# Patient Record
Sex: FEMALE | Race: WHITE | NOT HISPANIC OR LATINO | Employment: UNEMPLOYED | URBAN - METROPOLITAN AREA
[De-identification: names, ages, dates, MRNs, and addresses within clinical notes are randomized per-mention and may not be internally consistent; named-entity substitution may affect disease eponyms.]

---

## 2017-03-19 ENCOUNTER — APPOINTMENT (EMERGENCY)
Dept: RADIOLOGY | Facility: HOSPITAL | Age: 27
End: 2017-03-19
Payer: SUBSIDIZED

## 2017-03-19 ENCOUNTER — HOSPITAL ENCOUNTER (EMERGENCY)
Facility: HOSPITAL | Age: 27
Discharge: HOME | End: 2017-03-19
Attending: EMERGENCY MEDICINE | Admitting: EMERGENCY MEDICINE
Payer: SUBSIDIZED

## 2017-03-19 VITALS
HEIGHT: 68 IN | BODY MASS INDEX: 44.41 KG/M2 | OXYGEN SATURATION: 96 % | WEIGHT: 293 LBS | SYSTOLIC BLOOD PRESSURE: 172 MMHG | HEART RATE: 93 BPM | DIASTOLIC BLOOD PRESSURE: 90 MMHG | TEMPERATURE: 99.3 F | RESPIRATION RATE: 20 BRPM

## 2017-03-19 DIAGNOSIS — M79.606 LEG PAIN: Primary | ICD-10-CM

## 2017-03-19 PROCEDURE — 73564 X-RAY EXAM KNEE 4 OR MORE: CPT

## 2017-03-19 PROCEDURE — 73620 X-RAY EXAM OF FOOT: CPT

## 2017-03-19 PROCEDURE — 73590 X-RAY EXAM OF LOWER LEG: CPT

## 2017-03-19 PROCEDURE — 73610 X-RAY EXAM OF ANKLE: CPT

## 2017-03-19 PROCEDURE — 99284 EMERGENCY DEPT VISIT MOD MDM: CPT

## 2017-03-19 RX ORDER — HYDROCODONE BITARTRATE AND ACETAMINOPHEN 5; 325 MG/1; MG/1
1 TABLET ORAL EVERY 8 HOURS PRN
Qty: 10 TABLET | Refills: 0 | Status: SHIPPED | OUTPATIENT
Start: 2017-03-19 | End: 2017-03-22

## 2017-03-19 RX ORDER — NAPROXEN 500 MG/1
500 TABLET ORAL ONCE
Status: COMPLETED | OUTPATIENT
Start: 2017-03-19 | End: 2017-03-19

## 2017-03-19 RX ORDER — HYDROCODONE BITARTRATE AND ACETAMINOPHEN 5; 325 MG/1; MG/1
1 TABLET ORAL ONCE
Status: COMPLETED | OUTPATIENT
Start: 2017-03-19 | End: 2017-03-19

## 2017-03-19 RX ORDER — NAPROXEN 500 MG/1
500 TABLET ORAL 2 TIMES DAILY WITH MEALS
Qty: 10 TABLET | Refills: 0 | Status: SHIPPED | OUTPATIENT
Start: 2017-03-19 | End: 2018-01-03

## 2017-03-19 RX ADMIN — NAPROXEN 500 MG: 500 TABLET ORAL at 19:24

## 2017-03-19 RX ADMIN — HYDROCODONE BITARTRATE AND ACETAMINOPHEN 1 TABLET: 5; 325 TABLET ORAL at 19:25

## 2017-05-28 ENCOUNTER — HOSPITAL ENCOUNTER (EMERGENCY)
Facility: HOSPITAL | Age: 27
Discharge: HOME | End: 2017-05-28
Attending: EMERGENCY MEDICINE | Admitting: EMERGENCY MEDICINE
Payer: SUBSIDIZED

## 2017-05-28 VITALS
OXYGEN SATURATION: 99 % | SYSTOLIC BLOOD PRESSURE: 176 MMHG | WEIGHT: 293 LBS | DIASTOLIC BLOOD PRESSURE: 96 MMHG | RESPIRATION RATE: 20 BRPM | BODY MASS INDEX: 76.78 KG/M2 | TEMPERATURE: 99 F | HEART RATE: 101 BPM

## 2017-05-28 DIAGNOSIS — N39.0 UTI (URINARY TRACT INFECTION): Primary | ICD-10-CM

## 2017-05-28 LAB
BACTERIA UR QL AUTO: ABNORMAL /HPF
BILIRUB UR QL STRIP: NEGATIVE
CLARITY UR: ABNORMAL
COLOR UR: YELLOW
GLUCOSE UR STRIP-MCNC: NEGATIVE MG/DL
HCG UR QL: NEGATIVE
HGB UR QL STRIP.AUTO: NEGATIVE
KETONES UR STRIP-MCNC: NEGATIVE MG/DL
LEUKOCYTE ESTERASE UR QL STRIP: ABNORMAL
NITRITE UR QL STRIP: NEGATIVE
NON-SQ EPI CELLS URNS QL MICRO: ABNORMAL /HPF
PH UR STRIP.AUTO: 6.5 [PH] (ref 5–9)
PROT UR STRIP-MCNC: NEGATIVE MG/DL
RBC #/AREA URNS AUTO: ABNORMAL /HPF
SP GR UR STRIP.AUTO: 1.02 (ref 1–1.03)
UROBILINOGEN UR QL STRIP.AUTO: 0.2 E.U./DL
WBC #/AREA URNS AUTO: ABNORMAL /HPF

## 2017-05-28 PROCEDURE — 81001 URINALYSIS AUTO W/SCOPE: CPT | Performed by: EMERGENCY MEDICINE

## 2017-05-28 PROCEDURE — 81025 URINE PREGNANCY TEST: CPT | Performed by: EMERGENCY MEDICINE

## 2017-05-28 PROCEDURE — 87086 URINE CULTURE/COLONY COUNT: CPT | Performed by: EMERGENCY MEDICINE

## 2017-05-28 PROCEDURE — 99284 EMERGENCY DEPT VISIT MOD MDM: CPT

## 2017-05-28 PROCEDURE — 87147 CULTURE TYPE IMMUNOLOGIC: CPT | Performed by: EMERGENCY MEDICINE

## 2017-05-28 RX ORDER — CEFADROXIL 500 MG/1
500 CAPSULE ORAL 2 TIMES DAILY
Qty: 14 CAPSULE | Refills: 0 | Status: SHIPPED | OUTPATIENT
Start: 2017-05-28 | End: 2017-06-04

## 2017-05-28 RX ORDER — ALBUTEROL SULFATE 90 UG/1
2 AEROSOL, METERED RESPIRATORY (INHALATION) EVERY 6 HOURS PRN
COMMUNITY
End: 2018-06-18

## 2017-05-28 RX ORDER — NAPROXEN 500 MG/1
500 TABLET ORAL ONCE
Status: COMPLETED | OUTPATIENT
Start: 2017-05-28 | End: 2017-05-28

## 2017-05-28 RX ORDER — CEPHALEXIN 500 MG/1
500 CAPSULE ORAL ONCE
Status: COMPLETED | OUTPATIENT
Start: 2017-05-28 | End: 2017-05-28

## 2017-05-28 RX ORDER — NAPROXEN 500 MG/1
500 TABLET ORAL 2 TIMES DAILY WITH MEALS
Qty: 10 TABLET | Refills: 0 | Status: SHIPPED | OUTPATIENT
Start: 2017-05-28 | End: 2018-01-05

## 2017-05-28 RX ADMIN — NAPROXEN 500 MG: 500 TABLET ORAL at 21:03

## 2017-05-28 RX ADMIN — CEPHALEXIN 500 MG: 500 CAPSULE ORAL at 21:03

## 2017-05-30 LAB
BACTERIA UR CULT: NORMAL
BACTERIA UR CULT: NORMAL

## 2017-12-05 ENCOUNTER — GENERIC CONVERSION - ENCOUNTER (OUTPATIENT)
Dept: OTHER | Facility: OTHER | Age: 27
End: 2017-12-05

## 2017-12-05 DIAGNOSIS — N93.8 OTHER SPECIFIED ABNORMAL UTERINE AND VAGINAL BLEEDING: ICD-10-CM

## 2017-12-06 ENCOUNTER — HOSPITAL ENCOUNTER (OUTPATIENT)
Dept: ULTRASOUND IMAGING | Facility: HOSPITAL | Age: 27
Discharge: HOME | End: 2017-12-06
Payer: COMMERCIAL

## 2017-12-06 DIAGNOSIS — N93.8 OTHER SPECIFIED ABNORMAL UTERINE AND VAGINAL BLEEDING: ICD-10-CM

## 2017-12-06 PROCEDURE — 76856 US EXAM PELVIC COMPLETE: CPT

## 2017-12-06 PROCEDURE — 76830 TRANSVAGINAL US NON-OB: CPT

## 2017-12-08 ENCOUNTER — GENERIC CONVERSION - ENCOUNTER (OUTPATIENT)
Dept: OTHER | Facility: OTHER | Age: 27
End: 2017-12-08

## 2017-12-12 ENCOUNTER — GENERIC CONVERSION - ENCOUNTER (OUTPATIENT)
Dept: OTHER | Facility: OTHER | Age: 27
End: 2017-12-12

## 2017-12-12 ENCOUNTER — ALLSCRIPTS OFFICE VISIT (OUTPATIENT)
Dept: OTHER | Facility: OTHER | Age: 27
End: 2017-12-12

## 2017-12-13 NOTE — PROGRESS NOTES
Assessment    1  DUB (dysfunctional uterine bleeding) (626 8) (N93 8)    Discussion/Summary  Discussion Summary:   With hormonal treatment options now being exhausted  the next of being a dilatation curettage as is reasonable  that has been explained the patient that there is no guarantee that this will make her bleeding stops  she also would like to return to fertility specialist afterwards  informed consent for dilatation curettage and hysteroscopy was obtained  she will have her surgery in the hospital operating room  Counseling Documentation With Imm: total time of encounter was 25 minutes-- and-- 20 minutes was spent counseling  Chief Complaint  Chief Complaint Free Text Note Form: Patient presents today for a discussion about her menses  She states her menses lasted 3 months and ended yesterday, but can start again at any moment  History of Present Illness  HPI: for 3 months this patient has been bleeding on a daily basis  for 2 months she had been tried on medication being hormone  she had been on Provera up to 3 tablets daily as of late on Nortrel oral contraceptives 2 tablets daily  only limited success to stop her bleeding has been accomplished  she was seen by Dr Allie Mcginnis infertility specialist  he advises next steps for her to have a dilatation and curettage  she had 2 ultrasound recently which showed a thickened endometrial lining up to 2 cm  more abnormal findings were noted  patient was hesitant to have a pelvic exam since device with the transvaginal ultrasound she had extra bleeding afterwards  her goal is to conceive  she had a history of amenorrhea for 2 years she is morbidly obese with a BMI of 78      Active Problems  1  DUB (dysfunctional uterine bleeding) (626 8) (N93 8)   2  Iliotibial band syndrome of left side (728 89) (M76 32)   3  Infertility due to oligo-ovulation (628 0) (N97 0)   4  Irregular menstrual cycle (626 4) (N92 6)   5   Localized osteoarthritis of left knee (538 79) (M17 12)   6  Morbid obesity (278 01) (E66 01)   7  Poor flexibility of tendon (727 81) (M67 80)   8  Super obesity (278 00) (E66 01)    Past Medical History  1  History of  0 (V49 89)   2  History of fracture of finger (V15 51) (Z87 81)    Surgical History  1  Denied: History Of Prior Surgery    Family History  Mother    1  Family history of melanoma (V16 8) (Z80 8)  Maternal Grandmother    2  Family history of hypertension (V17 49) (Z82 49)  Paternal Aunt    3  Family history of diabetes mellitus (V18 0) (Z83 3)   4  Family history of thyroid disease (V18 19) (Z83 49)   5  Family history of Thyroid Disorder (V18 19)  Paternal Uncle    10  Family history of Diabetes Mellitus (V18 0)    Social History     · Always uses seat belt   · Being A Social Drinker   · Daily Coffee Consumption (1  Cups/Day)   · Denied: History of    · Lives with spouse   ·    · Never a smoker   · Sexually active    Current Meds   1  Iron TABS; Therapy: (Recorded:85Rfm4079) to Recorded   2  MetFORMIN HCl - 500 MG Oral Tablet; Therapy: (Recorded:87Mzd3135) to Recorded   3  Ventolin  (90 Base) MCG/ACT Inhalation Aerosol Solution; Therapy: 90ZTM8166 to (Evaluate:97Lbp9190) Recorded    Allergies  1  OxyCODONE HCl TABS    Vitals  Vital Signs    Recorded: 58KCB0606 66:76AT   Systolic 930   Diastolic 82   Height 5 ft 7 in   Weight 502 lb 6 4 oz   BMI Calculated 78 69   BSA Calculated 2 99   LMP [id number]       Physical Exam   Constitutional  General appearance: Abnormal   under developed-- and-- morbidly obese  Pulmonary  Auscultation of lungs: Clear to auscultation  Cardiovascular  Auscultation of heart: Normal rate and rhythm, normal S1 and S2, no murmurs  Genitourinary  External genitalia: Normal and no lesions appreciated  Vagina: Normal, no lesions or dryness appreciated  Urethra: Normal    Urethral meatus: Normal    Bladder: Normal, soft, non-tender and no prolapse or masses appreciated     Cervix: Normal, no palpable masses  Examination of the cervix revealed normal findings  A Pap smear was not performed  Uterus: Normal, non-tender, not enlarged, and no palpable masses  Adnexa/parametria: Normal, non-tender and no fullness or masses appreciated         Signatures   Electronically signed by : AUDRA Freeman ; Dec 12 2017 10:43AM EST                       (Author)

## 2017-12-18 ENCOUNTER — GENERIC CONVERSION - ENCOUNTER (OUTPATIENT)
Dept: OTHER | Facility: OTHER | Age: 27
End: 2017-12-18

## 2018-01-03 RX ORDER — FERROUS SULFATE 325(65) MG
325 TABLET ORAL
COMMUNITY
End: 2018-06-18

## 2018-01-05 ENCOUNTER — ANESTHESIA EVENT (OUTPATIENT)
Dept: PERIOP | Facility: HOSPITAL | Age: 28
End: 2018-01-05
Payer: COMMERCIAL

## 2018-01-11 NOTE — MISCELLANEOUS
Message  Return to work or school:   Irlanda Talavera is under my professional care   She was seen in my office on January Signatures   Electronically signed by : AUDRA Singer ; Jan 13 2016 12:44PM EST                       (Author)

## 2018-01-11 NOTE — MISCELLANEOUS
Message  Return to work or school:   Kaylin Moreland is under my professional care  She was seen in my office on 1/13/2016     She is able to work with limitations  Continue current work restrictions until further follow up in 4 weeks time  Kev Whitt PA-C  Signatures   Electronically signed by :  Kev Whtit, [facility]; Jan 13 2016 12:17PM EST                       (Author)

## 2018-01-14 NOTE — ANESTHESIA PREPROCEDURE EVALUATION
Review of Systems/Medical History  Patient summary reviewed  Chart reviewed  No history of anesthetic complications     Cardiovascular   Pulmonary  Asthma (Seasonal): Asthma type of rescue: PRN inhaler, ,        GI/Hepatic  Negative GI/hepatic ROS               Endo/Other  Diabetes (Prediabetes) ,      GYN      Comment: Dysfunctional uterine bleeding/An-ovulatory     Hematology  Anemia ,     Musculoskeletal  Obesity (BMI 76)  super morbid obesity, Osteoarthritis,        Neurology  Negative neurology ROS      Psychology   Negative psychology ROS            Physical Exam    Airway  Comment: Excessive amount of soft tissue face and neck  Mallampati score: II  TM Distance: >3 FB       Dental   No notable dental hx     Cardiovascular  Rhythm: regular, Rate: normal,     Pulmonary  Breath sounds clear to auscultation,     Other Findings  Super morbidly obese pt      Anesthesia Plan  ASA Score- 3     Anesthesia Type- general with ASA Monitors  Additional Monitors:   Airway Plan: ETT  Plan Factors-    Induction- intravenous  Postoperative Plan- Plan for postoperative opioid use  Planned trial extubation    Informed Consent- Anesthetic plan and risks discussed with patient  I personally reviewed this patient with the CRNA  Discussed and agreed on the Anesthesia Plan with the CRNA  Halina Palafox

## 2018-01-15 ENCOUNTER — HOSPITAL ENCOUNTER (OUTPATIENT)
Facility: HOSPITAL | Age: 28
Setting detail: OUTPATIENT SURGERY
Discharge: HOME | End: 2018-01-15
Attending: OBSTETRICS & GYNECOLOGY | Admitting: OBSTETRICS & GYNECOLOGY
Payer: COMMERCIAL

## 2018-01-15 ENCOUNTER — ANESTHESIA (OUTPATIENT)
Dept: PERIOP | Facility: HOSPITAL | Age: 28
End: 2018-01-15
Payer: COMMERCIAL

## 2018-01-15 VITALS
SYSTOLIC BLOOD PRESSURE: 150 MMHG | RESPIRATION RATE: 18 BRPM | OXYGEN SATURATION: 98 % | DIASTOLIC BLOOD PRESSURE: 72 MMHG | HEART RATE: 106 BPM | HEIGHT: 68 IN | TEMPERATURE: 98.6 F | WEIGHT: 293 LBS | BODY MASS INDEX: 44.41 KG/M2

## 2018-01-15 DIAGNOSIS — N93.9 ABNORMAL UTERINE BLEEDING: ICD-10-CM

## 2018-01-15 LAB
ABO GROUP BLD: NORMAL
BLD GP AB SCN SERPL QL: NEGATIVE
ERYTHROCYTE [DISTWIDTH] IN BLOOD BY AUTOMATED COUNT: 15.4 % (ref 11.6–15.1)
EXT PREGNANCY TEST URINE: NEGATIVE
HCT VFR BLD AUTO: 37.5 % (ref 34.8–46.1)
HGB BLD-MCNC: 11.5 G/DL (ref 11.5–15.4)
MCH RBC QN AUTO: 23.9 PG (ref 26.8–34.3)
MCHC RBC AUTO-ENTMCNC: 30.7 G/DL (ref 31.4–37.4)
MCV RBC AUTO: 78 FL (ref 82–98)
PLATELET # BLD AUTO: 340 THOUSANDS/UL (ref 149–390)
PMV BLD AUTO: 9.3 FL (ref 8.9–12.7)
RBC # BLD AUTO: 4.81 MILLION/UL (ref 3.81–5.12)
RH BLD: NEGATIVE
SPECIMEN EXPIRATION DATE: NORMAL
WBC # BLD AUTO: 11.26 THOUSAND/UL (ref 4.31–10.16)

## 2018-01-15 PROCEDURE — 88305 TISSUE EXAM BY PATHOLOGIST: CPT | Performed by: OBSTETRICS & GYNECOLOGY

## 2018-01-15 PROCEDURE — 81025 URINE PREGNANCY TEST: CPT | Performed by: ANESTHESIOLOGY

## 2018-01-15 PROCEDURE — 86901 BLOOD TYPING SEROLOGIC RH(D): CPT | Performed by: OBSTETRICS & GYNECOLOGY

## 2018-01-15 PROCEDURE — 85027 COMPLETE CBC AUTOMATED: CPT | Performed by: OBSTETRICS & GYNECOLOGY

## 2018-01-15 PROCEDURE — 86850 RBC ANTIBODY SCREEN: CPT | Performed by: OBSTETRICS & GYNECOLOGY

## 2018-01-15 PROCEDURE — 86900 BLOOD TYPING SEROLOGIC ABO: CPT | Performed by: OBSTETRICS & GYNECOLOGY

## 2018-01-15 RX ORDER — ACETAMINOPHEN 325 MG/1
650 TABLET ORAL EVERY 6 HOURS PRN
Status: DISCONTINUED | OUTPATIENT
Start: 2018-01-15 | End: 2018-01-15 | Stop reason: HOSPADM

## 2018-01-15 RX ORDER — LIDOCAINE HYDROCHLORIDE 10 MG/ML
INJECTION, SOLUTION EPIDURAL; INFILTRATION; INTRACAUDAL; PERINEURAL AS NEEDED
Status: DISCONTINUED | OUTPATIENT
Start: 2018-01-15 | End: 2018-01-15 | Stop reason: SURG

## 2018-01-15 RX ORDER — METOCLOPRAMIDE HYDROCHLORIDE 5 MG/ML
10 INJECTION INTRAMUSCULAR; INTRAVENOUS ONCE AS NEEDED
Status: DISCONTINUED | OUTPATIENT
Start: 2018-01-15 | End: 2018-01-15 | Stop reason: HOSPADM

## 2018-01-15 RX ORDER — ONDANSETRON 2 MG/ML
INJECTION INTRAMUSCULAR; INTRAVENOUS AS NEEDED
Status: DISCONTINUED | OUTPATIENT
Start: 2018-01-15 | End: 2018-01-15 | Stop reason: SURG

## 2018-01-15 RX ORDER — ONDANSETRON 2 MG/ML
4 INJECTION INTRAMUSCULAR; INTRAVENOUS EVERY 6 HOURS PRN
Status: DISCONTINUED | OUTPATIENT
Start: 2018-01-15 | End: 2018-01-15 | Stop reason: HOSPADM

## 2018-01-15 RX ORDER — SODIUM CHLORIDE, SODIUM LACTATE, POTASSIUM CHLORIDE, CALCIUM CHLORIDE 600; 310; 30; 20 MG/100ML; MG/100ML; MG/100ML; MG/100ML
50 INJECTION, SOLUTION INTRAVENOUS CONTINUOUS
Status: DISCONTINUED | OUTPATIENT
Start: 2018-01-15 | End: 2018-01-15 | Stop reason: HOSPADM

## 2018-01-15 RX ORDER — SUCCINYLCHOLINE CHLORIDE 20 MG/ML
INJECTION INTRAMUSCULAR; INTRAVENOUS AS NEEDED
Status: DISCONTINUED | OUTPATIENT
Start: 2018-01-15 | End: 2018-01-15 | Stop reason: SURG

## 2018-01-15 RX ORDER — FENTANYL CITRATE/PF 50 MCG/ML
50 SYRINGE (ML) INJECTION
Status: DISCONTINUED | OUTPATIENT
Start: 2018-01-15 | End: 2018-01-15 | Stop reason: HOSPADM

## 2018-01-15 RX ORDER — PROPOFOL 10 MG/ML
INJECTION, EMULSION INTRAVENOUS AS NEEDED
Status: DISCONTINUED | OUTPATIENT
Start: 2018-01-15 | End: 2018-01-15 | Stop reason: SURG

## 2018-01-15 RX ORDER — MEPERIDINE HYDROCHLORIDE 25 MG/ML
12.5 INJECTION INTRAMUSCULAR; INTRAVENOUS; SUBCUTANEOUS ONCE AS NEEDED
Status: DISCONTINUED | OUTPATIENT
Start: 2018-01-15 | End: 2018-01-15 | Stop reason: HOSPADM

## 2018-01-15 RX ORDER — SODIUM CHLORIDE, SODIUM LACTATE, POTASSIUM CHLORIDE, CALCIUM CHLORIDE 600; 310; 30; 20 MG/100ML; MG/100ML; MG/100ML; MG/100ML
125 INJECTION, SOLUTION INTRAVENOUS CONTINUOUS
Status: DISCONTINUED | OUTPATIENT
Start: 2018-01-15 | End: 2018-01-15

## 2018-01-15 RX ORDER — SODIUM CHLORIDE, SODIUM LACTATE, POTASSIUM CHLORIDE, CALCIUM CHLORIDE 600; 310; 30; 20 MG/100ML; MG/100ML; MG/100ML; MG/100ML
125 INJECTION, SOLUTION INTRAVENOUS CONTINUOUS
Status: DISCONTINUED | OUTPATIENT
Start: 2018-01-15 | End: 2018-01-15 | Stop reason: HOSPADM

## 2018-01-15 RX ORDER — KETOROLAC TROMETHAMINE 30 MG/ML
INJECTION, SOLUTION INTRAMUSCULAR; INTRAVENOUS AS NEEDED
Status: DISCONTINUED | OUTPATIENT
Start: 2018-01-15 | End: 2018-01-15 | Stop reason: SURG

## 2018-01-15 RX ORDER — ONDANSETRON 2 MG/ML
4 INJECTION INTRAMUSCULAR; INTRAVENOUS ONCE AS NEEDED
Status: DISCONTINUED | OUTPATIENT
Start: 2018-01-15 | End: 2018-01-15 | Stop reason: HOSPADM

## 2018-01-15 RX ORDER — FENTANYL CITRATE 50 UG/ML
INJECTION, SOLUTION INTRAMUSCULAR; INTRAVENOUS AS NEEDED
Status: DISCONTINUED | OUTPATIENT
Start: 2018-01-15 | End: 2018-01-15 | Stop reason: SURG

## 2018-01-15 RX ORDER — IBUPROFEN 600 MG/1
600 TABLET ORAL EVERY 6 HOURS PRN
Status: DISCONTINUED | OUTPATIENT
Start: 2018-01-15 | End: 2018-01-15 | Stop reason: HOSPADM

## 2018-01-15 RX ADMIN — LIDOCAINE HYDROCHLORIDE 100 MG: 10 INJECTION, SOLUTION EPIDURAL; INFILTRATION; INTRACAUDAL; PERINEURAL at 09:21

## 2018-01-15 RX ADMIN — ONDANSETRON 4 MG: 2 INJECTION INTRAMUSCULAR; INTRAVENOUS at 09:35

## 2018-01-15 RX ADMIN — DEXAMETHASONE SODIUM PHOSPHATE 10 MG: 10 INJECTION INTRAMUSCULAR; INTRAVENOUS at 09:35

## 2018-01-15 RX ADMIN — PROPOFOL 400 MG: 10 INJECTION, EMULSION INTRAVENOUS at 09:21

## 2018-01-15 RX ADMIN — SODIUM CHLORIDE, SODIUM LACTATE, POTASSIUM CHLORIDE, AND CALCIUM CHLORIDE 125 ML/HR: .6; .31; .03; .02 INJECTION, SOLUTION INTRAVENOUS at 08:30

## 2018-01-15 RX ADMIN — KETOROLAC TROMETHAMINE 30 MG: 30 INJECTION, SOLUTION INTRAMUSCULAR at 09:57

## 2018-01-15 RX ADMIN — SUCCINYLCHOLINE CHLORIDE 160 MG: 20 INJECTION, SOLUTION INTRAMUSCULAR; INTRAVENOUS at 09:22

## 2018-01-15 NOTE — OP NOTE
OPERATIVE REPORT  PATIENT NAME: Marcia Roldan    :  1990  MRN: [id number]  Pt Location: [facility] OR ROOM 07    SURGERY DATE: 1/15/2018    Surgeon(s) and Role:     * Pauline Kawasaki, MD - Primary     * Idris Varner - Assisting    Preop Diagnosis:  Abnormal uterine bleeding [N93 9]    Post-Op Diagnosis Codes:     * Abnormal uterine bleeding [N93 9]    Procedure(s) (LRB):  DILATATION AND CURETTAGE (D&C) WITH HYSTEROSCOPY (N/A)    Specimen(s):  ID Type Source Tests Collected by Time Destination   1 : curettings Tissue Endometrium TISSUE EXAM Pauline Kawasaki, MD 1/15/2018 3560        Estimated Blood Loss:   Minimal    Drains:       Anesthesia Type:   General    Operative Indications:  Abnormal uterine bleeding [N93 9]    Operative Findings:  #1  Normal appearing vaginal, vulva, cervix  #2  Hysteroscopy: Moderate atrophic endometrium consistent with progesterone intake w/ mild endometrium sloughing  Bilateral ostia not visualized  No submucosal or pedunculated fibroids, no polyps, no abnormal masses  No midline septum  Normal anatomical uterus  Complications:   None    Procedure and Technique:  Patient placed on bariatric bed  General anesthesia established  SCDs secured  Place din dorsal lithotomy position  Graves speculum inserted to visualize cervix which was grasped with a single tooth tenaculum at the anterior lip  The graves speculum was dismantled and removed  The francesca and alves retractor was inserted into vaginal  The uterus was sounded to 8cm  The cervix was dilated to allow for entry of a hysteroscope  The hysteroscope was inserted and the uterus was hydrodistended with 120 cc of isotonic fluid w/ fluid return noted  Evaluation of inside of uterus was completed with above mentioned findings  The hysteroscope was removed and a endometrial curette was inserted  The curette was used to obtain endometrial curettings in a circumferential manner x 4   Adequate tissue was obtained and sent for pathology  The tenaclum was removed and the puncture sites were hemostatic  All instruments removed from vagina  Patient tolerated procedure well  All needles instruments, sponges were accounted for x 2  [place]att[place] to PACU   Dr Cari Caldera present for entire procedure      SIGNATURE: Danya Lomeli  DATE: January 15, 2018  TIME: 10:08 AM

## 2018-01-15 NOTE — H&P
H&P- Gynecology   Emma Underwood 33 y o  female MRN: [id number]  Unit/Bed#: OR [place] Encounter: [id number]    The following history was obtained from patient documentation and patient  Allscripts H&P from 12/12/17 is outdated  The following is an updated H&P      SUBJECTIVE  HPI: Emma Underwood is a 33 y o  who presents with for 3 months bleeding on a daily basis  For 2 months she had been tried on medication being hormone including Provera up to 3 tablets daily as of late on Nortrel oral contraceptives 2 tablets daily  Only limited success to stop her bleeding has been accomplished  She was seen by Dr Mart Media infertility specialist  he advises next steps for her to have a dilatation and curettage  She had 2 ultrasound recently which showed a thickened endometrial lining up to 2 cm  More abnormal findings were noted  Patient was hesitant to have a pelvic exam since device with the transvaginal ultrasound she had extra bleeding afterwards  her goal is to conceive  She had a history of amenorrhea for 2 years she is morbidly obese with a BMI of 78     Review of Systems   All other systems reviewed and are negative  Past Medical History:   Diagnosis Date    Anemia     Arthritis     Asthma     seasonal    DUB (dysfunctional uterine bleeding)     Iliotibial band syndrome of left side      Past Surgical History:   Procedure Laterality Date    NO PAST SURGERIES       OB History   No data available     History reviewed  No pertinent family history    Social History   History   Alcohol Use No     History   Drug Use No     History   Smoking Status    Never Smoker   Smokeless Tobacco    Never Used       Meds/Allergies   Current Facility-Administered Medications   Medication Dose Route Frequency    lactated ringers infusion  125 mL/hr Intravenous Continuous       Allergies   Allergen Reactions    Oxycodone Itching    Zoloft [Sertraline] Headache       Objective   Vitals:  Vitals:    01/15/18 0725 01/15/18 0737   BP: 150/75    Pulse: (!) 114    Resp: 20    Temp: 100 4 °F (38 °C)    TempSrc: Tympanic Core    SpO2: 97%    Weight:  (!) 249 kg (550 lb)   Height:  5' 8" (1 727 m)       No intake or output data in the 24 hours ending 01/15/18 0903    Physical Exam   Constitutional: She is oriented to person, place, and time  She appears well-developed and well-nourished  No distress  HENT:   Head: Normocephalic and atraumatic  Cardiovascular: Normal rate, regular rhythm and normal heart sounds  No murmur heard  Pulmonary/Chest: Effort normal and breath sounds normal  No respiratory distress  Abdominal: Soft  Bowel sounds are normal  She exhibits no distension  There is no tenderness  There is no rebound  Morbid obesity   Genitourinary:   Genitourinary Comments: deferred   Neurological: She is alert and oriented to person, place, and time  Skin: Skin is warm and dry  Psychiatric: She has a normal mood and affect  Her behavior is normal        LAB RESULTS  Admission on 01/15/2018   Component Date Value    WBC 01/15/2018 11 26*    RBC 01/15/2018 4 81     Hemoglobin 01/15/2018 11 5     Hematocrit 01/15/2018 37 5     MCV 01/15/2018 78*    MCH 01/15/2018 23 9*    MCHC 01/15/2018 30 7*    RDW 01/15/2018 15 4*    Platelets 42/53/9066 340     MPV 01/15/2018 9 3     EXT Preg Test, Ur 01/15/2018 Negative        IMAGING  TVUS 12/6/17  Heterogeneous endometrium with areas of venous flow and ill-defined lower uterine segment echogenicity  Discrete arterial vascularity is not seen, however and these changes are nonspecific, possibly related to blood products  By report, the patient had a complex appearing endometrium on an outside ultrasound from one week ago though this is not available for comparison  Recommend continued ultrasound follow-up versus endometrial sampling as clinically indicated       Assessment/Plan   With hormonal treatment options now being exhausted  the next of being a dilatation curettage as is reasonable  that has been explained the patient that there is no guarantee that this will make her bleeding stops  she also would like to return to fertility specialist afterwards  informed consent for dilatation curettage and hysteroscopy was obtained  she will have her surgery in the hospital operating room       Discussed with Dr Jeaneth Roper

## 2018-01-15 NOTE — ANESTHESIA POSTPROCEDURE EVALUATION
Post-Op Assessment Note      CV Status:  Stable    Mental Status:  Alert and awake    Hydration Status:  Euvolemic    PONV Controlled:  None    Airway Patency:  Patent    Post Op Vitals Reviewed: Yes          Staff: CRNA           BP      Temp      Pulse    Resp      SpO2

## 2018-01-16 ENCOUNTER — GENERIC CONVERSION - ENCOUNTER (OUTPATIENT)
Dept: OTHER | Facility: OTHER | Age: 28
End: 2018-01-16

## 2018-01-23 VITALS
SYSTOLIC BLOOD PRESSURE: 128 MMHG | DIASTOLIC BLOOD PRESSURE: 82 MMHG | HEIGHT: 67 IN | BODY MASS INDEX: 45.99 KG/M2 | WEIGHT: 293 LBS

## 2018-01-23 NOTE — MISCELLANEOUS
Message   Recorded as Task   Date: 12/11/2017 12:52 PM, Created By: Mayte Michael   Task Name: Result Follow Up   Assigned To: Erin Gandhi   Regarding Patient: Mable Gasca, Status: In Progress   Naeem Brennerhans - 11 Dec 2017 12:52 PM     TASK CREATED  This US was ordered for DUB/anovulatory bleeding for >3 mos  She transferred care to us last week from RE&I  Currently on Nortrel and Provera without improvement in bleeding  Labs were stable  Heterogenous endometrium measuring 11mm on this study  Other recent ED study showed endo stripe measuring >20mm  Please notify patient that this is somewhat improved, however given duration of bleeding would advise consult with MD/DO to discuss D&C  Please schedule an office visit at her earliest convenience  Of note, BMI 78 - she declined referral to Bariatric Medicine but did accept referral to Nutrition Counseling  Thanks   Erin Gandhi - 11 Dec 2017 2:06 PM     TASK IN PROGRESS   Erin Gandhi - 11 Dec 2017 2:19 PM     TASK EDITED  Spoke with pt - she is aware of the u/s report - states she is still bleeding  She wants to be seen this week - very adamant about it  Looked in both [address] [facility] - all booked  Advised will look into this further and call her back  Erin Gandhi - 11 Dec 2017 2:24 PM     TASK EDITED   Esau Thakur - 11 Dec 2017 2:34 PM     TASK EDITED  lm for pt to call back & speak to me to make an apt, ext [phone]   Nerissa Hernandez - 11 Dec 2017 3:36 PM     TASK EDITED  I sched pt for DUB preop with V87 [facility]   Erin Gandhi - 12 Dec 2017 7:29 AM     TASK IN PROGRESS        Active Problems    1  DUB (dysfunctional uterine bleeding) (626 8) (N93 8)   2  Iliotibial band syndrome of left side (728 89) (M76 32)   3  Infertility due to oligo-ovulation (628 0) (N97 0)   4  Irregular menstrual cycle (626 4) (N92 6)   5  Localized osteoarthritis of left knee (715 36) (M17 12)   6   Morbid obesity (278 01) (E66 01)   7  Poor flexibility of tendon (727 81) (M67 80)   8  Super obesity (278 00) (E66 01)    Current Meds   1  Iron TABS; Therapy: (Recorded:40Cod7497) to Recorded   2  MetFORMIN HCl - 500 MG Oral Tablet; Therapy: (Recorded:28Mmz3494) to Recorded   3  Ventolin  (90 Base) MCG/ACT Inhalation Aerosol Solution; Therapy: 64VEW2948 to (Evaluate:70Qip0562) Recorded    Allergies    1   OxyCODONE HCl TABS    Signatures   Electronically signed by : Ashley Redmond, ; Dec 12 2017  7:29AM EST                       (Author)

## 2018-01-23 NOTE — PROGRESS NOTES
Preliminary Nursing Report                Patient Information    Initial Encounter Entry Date:   2017 1:52 PM EST (Automated Transmission Automated Transmission)       Last Modified:   {Melodie Pennington}              Legal Name: Omar Cali        Social Security Number:        YOB: 1990        Age (years): 33        Gender: F        Body Mass Index (BMI): 79 kg/m2        Height: 67 in  Weight: 502 lbs (228 kgs)           Address:   [place] [address]              Phone: [phone]   (consent to leave messages)        Email:        Ethnicity: Decline to State        Synagogue:        Marital Status:        Preferred Language: English        Race: Other Race                    Patient Insurance Information        Primary Insurance Information Carrier Name: {Primary  CarrierName}           Carrier Address:   {Primary  CarrierAddress}              Carrier Phone: {Primary  CarrierPhone}          Group Number: {Primary  GroupNumber}          Policy Number: {Primary  PolicyNumber}          Insured Name: {Primary  InsuredName}          Insured : {Primary  InsuredDOB}          Relationship to Insured: {Primary  RelationshiptoInsured}           Secondary Insurance Information Carrier Name: {Secondary  CarrierName}           Carrier Address:   {Secondary  CarrierAddress}              Carrier Phone: {Secondary  CarrierPhone}          Group Number: {Secondary  GroupNumber}          Policy Number: {Secondary  PolicyNumber}          Insured Name: {Secondary  InsuredName}          Insured : {Secondary  InsuredDOB}          Relationship to Insured: {Secondary  RelationshiptoInsured}                       Health Profile   Booking #:   Zheng Matt #: [id number]               DOS: 01/15/2018    Surgery : HYSTEROSCOPY BIOPSY    Add'l Procedures/Notes:     Surgery Risk: Minor          Precautions     BMI                   Allergies    OxyCODONE HCl TABS       Clinical Comments: Reaction Type: , Reaction: , Severity:              Medications    Iron TABS       MetFORMIN HCl - 500 MG Oral Tablet       Ventolin  (90 Base) MCG/ACT Inhalation Aerosol Solution               Conditions    DUB (dysfunctional uterine bleeding)       Iliotibial band syndrome of left side       Infertility due to oligo-ovulation       Irregular menstrual cycle       Localized osteoarthritis of left knee       Morbid obesity       Poor flexibility of tendon       Super obesity               Family History    None             Surgical History    None             Social History    Always uses seat belt       Being A Social Drinker       Daily Coffee Consumption (1 Cups/Day)       Denies        Lives with spouse              Never a smoker       Sexually active                               Patient Instructions       Medical Procedure Risk  NPO Instructions   The day before surgery it is recommended to have a light dinner at your usual time and you are allowed a light snack early in the evening  Do not eat anything heavy or eat a big meal after 7pm  Do not eat or drink anything after midnight prior to your surgery  If you are supposed to take any of your medications, do so with a sip of water  Failure to follow these instructions can lead to an increased risk of lung complications and may result in a delay or cancellation of your procedure  If you have any questions, contact your institution for further instructions  No candy, no gum, no mints, no chewing tobacco                Testing Considerations       ? Pregnancy Test t  Consider a urine pregnancy test on the morning of surgery  Triggered by: Age or Facility Rec, Gender               Consultations       No recommendations for this classification  Miscellaneous Questions         Question: Are you able to walk up a flight of stairs, walk up a hill or do heavy housework WITHOUT having chest pain or shortness of breath?        Answer: Elsa Sequeira Allergies/Conditions/Medications Not Found        The following were not recognized by our system when generating the recommendations  Please consider if this would impact any preoperative protocols  ? Always uses seat belt       ? Being A Social Drinker       ? Daily Coffee Consumption (1 Cups/Day)       ? Denies        ? Lives with spouse       ?        ? Never a smoker       ? Sexually active       ? Iron TABS       ? MetFORMIN HCl - 500 MG Oral Tablet       ? Ventolin  (90 Base) MCG/ACT Inhalation Aerosol Solution                  Appointment Information         Date:    01/15/2018        Location:    [facility]        Address:           Directions:                      Footnotes revision 14      ?? Denotes a free-text entry  Legal Disclaimer: Any and all recommendations and services provided herein are designed to assist in the preoperative care of the patient  Nothing contained herein is designed to replace, eliminate or alleviate the responsibility of the attending physician to supervise and determine the patient?s preoperative care and course of treatment  Failure to provide complete, accurate information may negatively impact the system?s ability to recommend the proper preoperative protocol  THE ATTENDING PHYSICIAN IS RESPONSIBLE TO REVIEW THE SUGGESTED PREOPERATIVE PROTOCOLS/COURSE OF TREATMENT AND PRESCRIBE THE FINAL COURSE OF PREOPERATIVE TREATMENT IN CONSULTATION WITH THE PATIENT  THE ePREOP SYSTEM AND ITS MATERIALS ARE PROVIDED ? AS IS? WITHOUT WARRANTY OF ANY KIND, EXPRESS OR IMPLIED, INCLUDING, BUT NOT LIMITED TO, WARRANTIES OF PERFORMANCE OR MERCHANTABILITY OR FITNESS FOR A PARTICULAR PURPOSE  PATIENT AND PHYSICIANS HEREBY AGREE THAT THEIR USE OF THE MATERIALS AND RESOURCES ACT AS A CONSENT TO RELEASE AND WAIVE ePREOP FROM ANY AND ALL CLAIMS OF WARRANTY, TORT OR CONTRACT LAW OF ANY KIND  Electronically signed Alisha HUNT    Dec 18 2017  1:53PM EST

## 2018-01-23 NOTE — MISCELLANEOUS
Message  I returned patient's call  She reports she wishes to "get the ball rolling to schedule D&C"  Bleeding status unchanged - stable  She reports she had an [address] on Wed - this result is not yet back - advised will review when available and call her to schedule preop with MD/DO if indicated  Also advised we have not yet received her other records which she reports she emailed - staff looked for me this am  Pt states she will resend  Signatures   Electronically signed by : Tomi Hudson, [facility];  Dec  8 2017  3:03PM EST                       (Author)

## 2018-01-23 NOTE — MISCELLANEOUS
Message   Recorded as Task   Date: 01/16/2018 11:28 AM, Created By: Aurora Soto   Task Name: Medical Complaint Callback   Assigned To: Titus Bower   Regarding Patient: Qiana Arellano, Status: Active   Comment:    Lisa Love - 16 Jan 2018 11:28 AM     TASK CREATED  Caller: Self; Medical Complaint; [phone] (Home)  pt had a d&c yesterday with Dr Zari Moffett having terrible stiff neck and pain in her back  pls call  she is @ [phone]  NicholasShantel - 16 Jan 2018 11:29 AM     TASK IN PROGRESS   NicholasShantel - 16 Jan 2018 11:59 AM     TASK EDITED  Dear Dr Helen Baker:    Good afternoon  Pt called office today, complaining of severe pain in her neck, shoulders, and sides of her body after having D&C procedure  Pt was previously diagnosed with abnormal uterine bleeding, D&C was performed by you on 1/15/18  Pt states "she tried using Ibuprofen, however, said medication did nothing for her pain "  Pt rates her pain as a "8" on the pain scale  Pt further states she does not have any urinary symptoms nor fever  Pt was assessed as being morbidly obese with BMI of 78  Pt states she does not need to report to an ER at this time  Pt states she is allergic to Zoloft and Oxycodone  Please advise as to what is the next step upon your review of Pt's symptoms  Thank you and have a good day!     Philip Caballero MA   talked with patient about her neck pain, had no relief with tylenol or motrin  will Rx Toradol      Signatures   Electronically signed by : AUDRA Dawn ; Jan 16 2018 12:51PM EST                       (Author)

## 2018-01-24 VITALS
HEIGHT: 67 IN | WEIGHT: 293 LBS | DIASTOLIC BLOOD PRESSURE: 86 MMHG | SYSTOLIC BLOOD PRESSURE: 145 MMHG | BODY MASS INDEX: 45.99 KG/M2

## 2018-05-09 ENCOUNTER — OFFICE VISIT (OUTPATIENT)
Dept: URGENT CARE | Facility: CLINIC | Age: 28
End: 2018-05-09
Payer: COMMERCIAL

## 2018-05-09 VITALS
WEIGHT: 293 LBS | TEMPERATURE: 98.4 F | HEART RATE: 106 BPM | RESPIRATION RATE: 20 BRPM | DIASTOLIC BLOOD PRESSURE: 68 MMHG | SYSTOLIC BLOOD PRESSURE: 116 MMHG | OXYGEN SATURATION: 99 % | HEIGHT: 68 IN | BODY MASS INDEX: 44.41 KG/M2

## 2018-05-09 DIAGNOSIS — M54.50 ACUTE BILATERAL LOW BACK PAIN WITHOUT SCIATICA: Primary | ICD-10-CM

## 2018-05-09 PROBLEM — E66.01 MORBID OBESITY (HCC): Status: ACTIVE | Noted: 2017-12-05

## 2018-05-09 PROBLEM — N93.8 DUB (DYSFUNCTIONAL UTERINE BLEEDING): Status: ACTIVE | Noted: 2017-12-05

## 2018-05-09 PROBLEM — N97.0 INFERTILITY DUE TO OLIGO-OVULATION: Status: ACTIVE | Noted: 2017-12-05

## 2018-05-09 PROBLEM — G89.18 POSTOPERATIVE PAIN: Status: ACTIVE | Noted: 2018-01-16

## 2018-05-09 PROCEDURE — 99203 OFFICE O/P NEW LOW 30 MIN: CPT | Performed by: FAMILY MEDICINE

## 2018-05-09 RX ORDER — KETOROLAC TROMETHAMINE 30 MG/ML
30 INJECTION, SOLUTION INTRAMUSCULAR; INTRAVENOUS ONCE
Status: COMPLETED | OUTPATIENT
Start: 2018-05-09 | End: 2018-05-09

## 2018-05-09 RX ORDER — NAPROXEN 500 MG/1
500 TABLET ORAL 2 TIMES DAILY WITH MEALS
Qty: 14 TABLET | Refills: 0 | Status: SHIPPED | OUTPATIENT
Start: 2018-05-09 | End: 2018-05-29

## 2018-05-09 RX ORDER — CYCLOBENZAPRINE HCL 10 MG
10 TABLET ORAL 3 TIMES DAILY PRN
Qty: 10 TABLET | Refills: 0 | Status: SHIPPED | OUTPATIENT
Start: 2018-05-09 | End: 2018-05-29

## 2018-05-09 RX ADMIN — KETOROLAC TROMETHAMINE 30 MG: 30 INJECTION, SOLUTION INTRAMUSCULAR; INTRAVENOUS at 19:21

## 2018-05-09 NOTE — LETTER
May 9, 2018     Patient: Celeste Branch   YOB: 1990   Date of Visit: 5/9/2018           Celeste Branch was seen in my clinic on 5/9/2018  If you have any questions or concerns, please don't hesitate to call           Sincerely,          Kaycee Phillips MD

## 2018-05-09 NOTE — PATIENT INSTRUCTIONS
Acute low back pain due to muscle strain/spasm   - Toradol- 30 mg IM x 1 dose administered in office today   - Naproxen prescribed to help w/ pain but is to be started tomorrow since the Toradol was administered today   - Flexeril prescribed to help w/ muscle spasm, not to be taken prior to driving or operating machinery as it can cause drowsiness  - should rest and apply a heating pad to the back   - may try gentle massage and stretches   - should follow up w/ pcp for re-check in 1-2 days   - if symptoms persist despite treatment, worsen, or any new symptoms present, should be seen in the ER

## 2018-05-09 NOTE — PROGRESS NOTES
[address] [facility]        NAME: Valentine Aburto is a 33 y o  female  : 1990    MRN: [id number]  DATE: May 9, 2018  TIME: 7:42 PM    Assessment and Plan   Acute bilateral low back pain without sciatica [M54 5]  1  Acute bilateral low back pain without sciatica  ketorolac (TORADOL) injection 30 mg    cyclobenzaprine (FLEXERIL) 10 mg tablet    naproxen (NAPROSYN) 500 mg tablet         Patient Instructions     Patient Instructions   Acute low back pain due to muscle strain/spasm   - Toradol- 30 mg IM x 1 dose administered in office today   - Naproxen prescribed to help w/ pain but is to be started tomorrow since the Toradol was administered today   - Flexeril prescribed to help w/ muscle spasm, not to be taken prior to driving or operating machinery as it can cause drowsiness  - should rest and apply a heating pad to the back   - may try gentle massage and stretches   - should follow up w/ pcp for re-check in 1-2 days   - if symptoms persist despite treatment, worsen, or any new symptoms present, should be seen in the ER       Chief Complaint     Chief Complaint   Patient presents with    Back Pain     Pt here for back pain x1 week, pt states she has HX of ruputed disc L4/L5  pt didn't do anything about it  Pt used Advil which didn't help  History of Present Illness       32 yo female presents c/o low back pain x 1 week  She describes the pain as tightness and stiffness  No injuries, falls, or direct trauma to the area  She states she started a new job recently which involves a lot of repetitive bending and lifting and thinks that may be contributing to the pain  No swelling, bruising or rashes  She is able to walk and bear weight w/o any problems  Patient has a hx of chronic low back pain due to lumbar disc issues for which she used to see pain mgmt at [facility], however she states she has not followed up with them in years bc her back pain had resolved  She denies any pain radiating down the leg  She states she has chronic R leg tingling from the initial lumbar disc issue which is at baseline and is not exacerbated at this time  No saddle anesthesia  No loss of bowel/bladder control  No leg/foot weakness  She has been using Ibuprofen as needed but does not note any improvement  She is currently on OCPs, otherwise no daily med use  Denies any chance of pregnancy  Review of Systems   Review of Systems   Constitutional: Negative  Musculoskeletal:        As noted in HPI    Skin: Negative  Neurological: Negative  Current Medications       Current Outpatient Prescriptions:     Norethindrone-Eth Estradiol (NORTREL 1/35, 28, PO), Take 2 tablets by mouth daily, Disp: , Rfl:     albuterol (PROVENTIL HFA,VENTOLIN HFA) 90 mcg/act inhaler, Inhale 2 puffs every 6 (six) hours as needed for wheezing Only uses in the spring during allergy season , Disp: , Rfl:     cyclobenzaprine (FLEXERIL) 10 mg tablet, Take 1 tablet (10 mg total) by mouth 3 (three) times a day as needed for muscle spasms for up to 5 days, Disp: 10 tablet, Rfl: 0    ferrous sulfate 325 (65 Fe) mg tablet, Take 325 mg by mouth daily with breakfast, Disp: , Rfl:     naproxen (NAPROSYN) 500 mg tablet, Take 1 tablet (500 mg total) by mouth 2 (two) times a day with meals, Disp: 14 tablet, Rfl: 0  No current facility-administered medications for this visit       Current Allergies     Allergies as of 05/09/2018 - Reviewed 05/09/2018   Allergen Reaction Noted    Oxycodone Itching     Zoloft [sertraline] Headache 01/05/2018            The following portions of the patient's history were reviewed and updated as appropriate: allergies, current medications, past family history, past medical history, past social history, past surgical history and problem list      Past Medical History:   Diagnosis Date    Anemia     Arthritis     Asthma     seasonal    DUB (dysfunctional uterine bleeding)     Iliotibial band syndrome of left side     Obesity        Past Surgical History:   Procedure Laterality Date    NO PAST SURGERIES      [place] HYSTEROSCOPY,W/ENDO BX N/A 1/15/2018    Procedure: DILATATION AND CURETTAGE (D&C) WITH HYSTEROSCOPY;  Surgeon: Jose Guzman MD;  [facility];  Service: Gynecology       Family History   Problem Relation Age of Onset    Diabetes Mother     Skin cancer Mother     Diabetes Father          Medications have been verified  Objective   /68 (BP Location: Right arm, Patient Position: Sitting, Cuff Size: Extra-Large)   Pulse (!) 106   Temp 98 4 °F (36 9 °C) (Tympanic)   Resp 20   Ht 5' 8" (1 727 m)   Wt (!) 229 kg (505 lb 9 6 oz)   SpO2 99%   BMI 76 88 kg/m²        Physical Exam     Physical Exam   Constitutional: She is oriented to person, place, and time  Vital signs are normal  She appears well-developed and well-nourished  She is active and cooperative  Non-toxic appearance  She does not have a sickly appearance  She does not appear ill  No distress  Morbidly obese    Musculoskeletal:   Exam is limited due to morbidly obese body habitus  There is generalized mild tenderness to palpation of the lumbar spinal and paraspinal regions  No swelling, erythema, or bruising  No rashes  2+ DTRs  Strength and sensations intact in BL LE  Spinal ROM intact in all planes, but has pain w/ movement  Normal gait  Neurological: She is alert and oriented to person, place, and time  Skin: Skin is warm, dry and intact  No rash noted  She is not diaphoretic  Psychiatric: She has a normal mood and affect  Her behavior is normal  Judgment and thought content normal    Nursing note and vitals reviewed

## 2018-05-29 ENCOUNTER — HOSPITAL ENCOUNTER (EMERGENCY)
Facility: HOSPITAL | Age: 28
Discharge: HOME | End: 2018-05-29
Attending: EMERGENCY MEDICINE
Payer: COMMERCIAL

## 2018-05-29 ENCOUNTER — APPOINTMENT (EMERGENCY)
Dept: RADIOLOGY | Facility: HOSPITAL | Age: 28
End: 2018-05-29
Payer: COMMERCIAL

## 2018-05-29 VITALS
TEMPERATURE: 99.9 F | DIASTOLIC BLOOD PRESSURE: 70 MMHG | BODY MASS INDEX: 80.59 KG/M2 | SYSTOLIC BLOOD PRESSURE: 131 MMHG | HEART RATE: 100 BPM | WEIGHT: 293 LBS | OXYGEN SATURATION: 97 % | RESPIRATION RATE: 17 BRPM

## 2018-05-29 DIAGNOSIS — S93.609A FOOT SPRAIN: Primary | ICD-10-CM

## 2018-05-29 PROCEDURE — 73630 X-RAY EXAM OF FOOT: CPT

## 2018-05-29 PROCEDURE — 99283 EMERGENCY DEPT VISIT LOW MDM: CPT

## 2018-05-30 NOTE — ED PROVIDER NOTES
History  Chief Complaint   Patient presents with    Foot Injury     L foot injured 2 wks ago,stepped on shoval     Patient presents for evaluation of left foot pain  States she stepped on a shovel handle and twisted her foot 2 weeks ago  Still having pain  No ankle pain  No wound or puncture  History provided by:  Patient   used: No        Prior to Admission Medications   Prescriptions Last Dose Informant Patient Reported? Taking? Norethindrone-Eth Estradiol (NORTREL 1/35, 28, PO)   Yes No   Sig: Take 2 tablets by mouth daily   albuterol (PROVENTIL HFA,VENTOLIN HFA) 90 mcg/act inhaler   Yes No   Sig: Inhale 2 puffs every 6 (six) hours as needed for wheezing Only uses in the spring during allergy season    ferrous sulfate 325 (65 Fe) mg tablet   Yes No   Sig: Take 325 mg by mouth daily with breakfast      Facility-Administered Medications: None       Past Medical History:   Diagnosis Date    Anemia     Arthritis     Asthma     seasonal    DUB (dysfunctional uterine bleeding)     Iliotibial band syndrome of left side     Obesity        Past Surgical History:   Procedure Laterality Date    NO PAST SURGERIES      [place] HYSTEROSCOPY,W/ENDO BX N/A 1/15/2018    Procedure: DILATATION AND CURETTAGE (D&C) WITH HYSTEROSCOPY;  Surgeon: Jason Corona MD;  [facility];  Service: Gynecology       Family History   Problem Relation Age of Onset    Diabetes Mother     Skin cancer Mother     Diabetes Father      I have reviewed and agree with the history as documented  Social History   Substance Use Topics    Smoking status: Never Smoker    Smokeless tobacco: Never Used    Alcohol use Yes      Comment: occ        Review of Systems   All other systems reviewed and are negative  Physical Exam  Physical Exam   Constitutional: She is oriented to person, place, and time  No distress  Cardiovascular: Normal rate, regular rhythm and intact distal pulses      Pulmonary/Chest: Effort normal and breath sounds normal  No respiratory distress  Musculoskeletal: Normal range of motion  She exhibits tenderness  Feet:    Neurological: She is alert and oriented to person, place, and time  Skin: Capillary refill takes less than 2 seconds  She is not diaphoretic  Nursing note and vitals reviewed  Vital Signs  ED Triage Vitals [05/29/18 2226]   Temperature Pulse Respirations Blood Pressure SpO2   99 9 °F (37 7 °C) 100 17 131/70 97 %      Temp Source Heart Rate Source Patient Position - Orthostatic VS BP Location FiO2 (%)   Tympanic Monitor Sitting -- --      Pain Score       7           Vitals:    05/29/18 2226   BP: 131/70   Pulse: 100   Patient Position - Orthostatic VS: Sitting       Visual Acuity      ED Medications  Medications - No data to display    Diagnostic Studies  Results Reviewed     None                 XR foot 3+ views LEFT    (Results Pending)              Procedures  Procedures       Phone Contacts  ED Phone Contact    ED Course                               MDM  Number of Diagnoses or Management Options  Foot sprain:   Diagnosis management comments: Pulse ox 97% on RA indicating adequate oxygenation  Xray L foot: no fx or dislocation as read by me       Amount and/or Complexity of Data Reviewed  Tests in the radiology section of CPT®: ordered and reviewed  Independent visualization of images, tracings, or specimens: yes    Patient Progress  Patient progress: stable    CritCare Time    Disposition  Final diagnoses: Foot sprain     Time reflects when diagnosis was documented in both MDM as applicable and the Disposition within this note     Time User Action Codes Description Comment    5/29/2018 11:10 PM Ana Maria Dominguez 80 sprain       ED Disposition     ED Disposition Condition Comment    Discharge  Ursula Lacks discharge to home/self care      Condition at discharge: stable        Follow-up Information     Follow up With Specialties Details Why [facility] , DO Orthopedic Surgery   [address]  [phone]            Patient's Medications   Discharge Prescriptions    No medications on file     No discharge procedures on file      ED Provider  Electronically Signed by           Krystina Infante DO  05/29/18 6849

## 2018-05-30 NOTE — DISCHARGE INSTRUCTIONS
Foot Sprain   WHAT YOU NEED TO KNOW:   A foot sprain is caused by a stretched or torn ligament in the foot or toe  Ligaments are tough tissues that connect bones  DISCHARGE INSTRUCTIONS:   Seek care immediately if:   · You have numbness or tingling below the injury, such as in your toes  · The skin on your injured foot is blue or pale  · You have increased pain, even after you take pain medicine  Contact your healthcare provider if:   · You have new weakness in your foot  · You have new or increased swelling in your foot  · You have new or increased stiffness when you move your injured foot  · You have questions or concerns about your condition or care  Medicines:   · NSAIDs , such as ibuprofen, help decrease swelling, pain, and fever  This medicine is available with or without a doctor's order  NSAIDs can cause stomach bleeding or kidney problems in certain people  If you take blood thinner medicine, always ask if NSAIDs are safe for you  Always read the medicine label and follow directions  Do not give these medicines to children under 10months of age without direction from your child's healthcare provider  · Take your medicine as directed  Contact your healthcare provider if you think your medicine is not helping or if you have side effects  Tell him of her if you are allergic to any medicine  Keep a list of the medicines, vitamins, and herbs you take  Include the amounts, and when and why you take them  Bring the list or the pill bottles to follow-up visits  Carry your medicine list with you in case of an emergency  Self-care:   · Rest your foot  Limit movement in your sprained foot for the first 2 to 3 days  You might need crutches to take weight off your injured foot as it heals  Use crutches as directed  · Apply ice  on your foot for 15 to 20 minutes every hour or as directed  Use an ice pack, or put crushed ice in a plastic bag  Cover it with a towel   Ice helps prevent tissue damage and decreases swelling and pain  · Compress your foot  You may need to use tape or an elastic bandage to support your foot if you have a mild sprain  You may need a splint on your foot for support if your sprain is severe  Wear your splint for as many days as directed  · Elevate your foot  above the level of your heart as often as you can  This will help decrease swelling and pain  Prop your foot on pillows or blankets to keep it elevated comfortably  Exercise your foot:  You may be given exercises to improve your strength and to help decrease stiffness  The exercises and physical therapy can help restore strength and increase the range of motion in your foot  Ask your healthcare provider when you can return to your normal activities or play sports  Prevent another foot sprain:   · Warm up and stretch before you exercise  · Do not exercise when you feel pain or are tired  · Wear equipment to protect yourself when you play sports  Follow up with your healthcare provider as directed:  Write down your questions so you remember to ask them during your visits  © 2017 [facility] Information is for End User's use only and may not be sold, redistributed or otherwise used for commercial purposes  All illustrations and images included in CareNotes® are the copyrighted property of A D A M , Inc  or Neno Beasley  The above information is an  only  It is not intended as medical advice for individual conditions or treatments  Talk to your doctor, nurse or pharmacist before following any medical regimen to see if it is safe and effective for you

## 2018-06-18 ENCOUNTER — HOSPITAL ENCOUNTER (EMERGENCY)
Facility: HOSPITAL | Age: 28
Discharge: LEFT BEFORE DISCHARGE | End: 2018-06-18
Attending: EMERGENCY MEDICINE

## 2018-06-18 ENCOUNTER — APPOINTMENT (EMERGENCY)
Dept: RADIOLOGY | Facility: HOSPITAL | Age: 28
End: 2018-06-18

## 2018-06-18 VITALS
HEART RATE: 90 BPM | TEMPERATURE: 97.8 F | OXYGEN SATURATION: 98 % | SYSTOLIC BLOOD PRESSURE: 172 MMHG | DIASTOLIC BLOOD PRESSURE: 90 MMHG | RESPIRATION RATE: 21 BRPM

## 2018-06-18 DIAGNOSIS — S09.90XA MINOR HEAD INJURY, INITIAL ENCOUNTER: ICD-10-CM
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
## 2018-06-18 DIAGNOSIS — S16.1XXA STRAIN OF NECK MUSCLE, INITIAL ENCOUNTER: ICD-10-CM

## 2018-06-18 PROCEDURE — 99283 EMERGENCY DEPT VISIT LOW MDM: CPT

## 2018-06-18 RX ORDER — CYCLOBENZAPRINE HCL 10 MG
10 TABLET ORAL ONCE
Status: COMPLETED | OUTPATIENT
Start: 2018-06-18 | End: 2018-06-18

## 2018-06-18 RX ORDER — NAPROXEN 500 MG/1
500 TABLET ORAL ONCE
Status: COMPLETED | OUTPATIENT
Start: 2018-06-18 | End: 2018-06-18

## 2018-06-18 RX ORDER — NAPROXEN 500 MG/1
500 TABLET ORAL 2 TIMES DAILY WITH MEALS
Qty: 10 TABLET | Refills: 0 | Status: SHIPPED | OUTPATIENT
Start: 2018-06-18 | End: 2018-12-14

## 2018-06-18 RX ORDER — CYCLOBENZAPRINE HCL 10 MG
10 TABLET ORAL 3 TIMES DAILY PRN
Qty: 15 TABLET | Refills: 0 | Status: SHIPPED | OUTPATIENT
Start: 2018-06-18 | End: 2018-08-03

## 2018-06-18 RX ADMIN — NAPROXEN 500 MG: 500 TABLET ORAL at 21:45

## 2018-06-18 RX ADMIN — CYCLOBENZAPRINE HYDROCHLORIDE 10 MG: 10 TABLET, FILM COATED ORAL at 21:48
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
## 2018-06-19 NOTE — DISCHARGE INSTRUCTIONS
Cervical Strain   WHAT YOU NEED TO KNOW:   A cervical strain is a stretched or torn muscle or tendon in your neck  Tendons are strong tissues that connect muscles to bones  Common causes of cervical strains include a car accident, a fall, or a sports injury  DISCHARGE INSTRUCTIONS:   Return to the emergency department if:   · You have pain or numbness from your shoulder down to your hand  · You have problems with your vision, hearing, or balance  · You feel confused or cannot concentrate  · You have problems with movement and strength  Contact your healthcare provider if:   · You have increased swelling or pain in your neck  · You have questions or concerns about your condition or care  Medicines: You may need any of the following:  · Acetaminophen  decreases pain and fever  It is available without a doctor's order  Ask how much to take and how often to take it  Follow directions  Read the labels of all other medicines you are using to see if they also contain acetaminophen, or ask your doctor or pharmacist  Acetaminophen can cause liver damage if not taken correctly  Do not use more than 4 grams (4,000 milligrams) total of acetaminophen in one day  · NSAIDs , such as ibuprofen, help decrease swelling, pain, and fever  This medicine is available with or without a doctor's order  NSAIDs can cause stomach bleeding or kidney problems in certain people  If you take blood thinner medicine, always ask your healthcare provider if NSAIDs are safe for you  Always read the medicine label and follow directions  · Muscle relaxers  help decrease pain and muscle spasms  · Prescription pain medicine  may be given  Ask your healthcare provider how to take this medicine safely  Some prescription pain medicines contain acetaminophen  Do not take other medicines that contain acetaminophen without talking to your healthcare provider  Too much acetaminophen may cause liver damage   Prescription pain medicine may cause constipation  Ask your healthcare provider how to prevent or treat constipation  · Take your medicine as directed  Contact your healthcare provider if you think your medicine is not helping or if you have side effects  Tell him or her if you are allergic to any medicine  Keep a list of the medicines, vitamins, and herbs you take  Include the amounts, and when and why you take them  Bring the list or the pill bottles to follow-up visits  Carry your medicine list with you in case of an emergency  Manage your symptoms:   · Apply heat  on your neck for 15 to 20 minutes, 4 to 6 times a day or as directed  Heat helps decrease pain, stiffness, and muscle spasms  · Begin gentle neck exercises  as soon as you can move your neck without pain  Exercises will help decrease stiffness and improve the strength and movement of your neck  Ask your healthcare provider what kind of exercises you should do  · Gradually return to your usual activities as directed  Stop if you have pain  Avoid activities that can cause more damage to your neck, such as heavy lifting or strenuous exercise  · Sleep without a pillow  to help decrease pain  Instead, roll a small towel tightly and place it under your neck  · Go to physical therapy as directed  A physical therapist teaches you exercises to help improve movement and strength, and to decrease pain  Prevent neck injury:   · Drive safely  Make sure everyone in your car wears a seatbelt  A seatbelt can save your life if you are in an accident  Do not use your cell phone when you are driving  This could distract you and cause an accident  Pull over if you need to make a call or send a text message  · Wear helmets, lifejackets, and protective gear  Always wear a helmet when you ride a bike or motorcycle, go skiing, or play sports that could cause a head injury  Wear protective equipment when you play sports   Wear a lifejacket when you are on a boat or doing water sports  Follow up with your healthcare provider as directed: You may be referred to an orthopedist or physical therapies  Write down your questions so you remember to ask them during your visits  © 2017 [employer] Information is for End User's use only and may not be sold, redistributed or otherwise used for commercial purposes  All illustrations and images included in CareNotes® are the copyrighted property of [employer]  or [employer]  The above information is an  only  It is not intended as medical advice for individual conditions or treatments  Talk to your doctor, nurse or pharmacist before following any medical regimen to see if it is safe and effective for you  Head Injury   WHAT YOU NEED TO KNOW:   A head injury is most often caused by a blow to the head  This may occur from a fall, bicycle injury, sports injury, being struck in the head, or a motor vehicle accident  DISCHARGE INSTRUCTIONS:   Call 911 or have someone else call for any of the following:   · You cannot be woken  · You have a seizure  · You stop responding to others or you faint  · You have blurry or double vision  · Your speech becomes slurred or confused  · You have arm or leg weakness, loss of feeling, or new problems with coordination  · Your pupils are larger than usual or one pupil is a different size than the other  · You have blood or clear fluid coming out of your ears or nose  Return to the emergency department if:   · You have repeated or forceful vomiting  · You feel confused  · Your headache gets worse or becomes severe  · You or someone caring for you notices that you are harder to wake than usual   Contact your healthcare provider if:   · Your symptoms last longer than 6 weeks after the injury  · You have questions or concerns about your condition or care  Medicines:   · Acetaminophen  decreases pain   Acetaminophen is available without a doctor's order  Ask how much to take and how often to take it  Follow directions  Acetaminophen can cause liver damage if not taken correctly  · Take your medicine as directed  Contact your healthcare provider if you think your medicine is not helping or if you have side effects  Tell him or her if you are allergic to any medicine  Keep a list of the medicines, vitamins, and herbs you take  Include the amounts, and when and why you take them  Bring the list or the pill bottles to follow-up visits  Carry your medicine list with you in case of an emergency  Self-care:   · Rest  or do quiet activities for 24 to 48 hours  Limit your time watching TV, using the computer, or doing tasks that require a lot of thinking  Slowly return to your normal activities as directed  Do not play sports or do activities that may cause you to get hit in the head  Ask your healthcare provider when you can return to sports  · Apply ice  on your head for 15 to 20 minutes every hour or as directed  Use an ice pack, or put crushed ice in a plastic bag  Cover it with a towel before you apply it to your skin  Ice helps prevent tissue damage and decreases swelling and pain  · Have someone stay with you for 24 hours  or as directed  This person can monitor you for complications and call [phone]  When you are awake the person should ask you a few questions to see if you are thinking clearly  An example would be to ask your name or your address  Prevent another head injury:   · Wear a helmet that fits properly  Do this when you play sports, or ride a bike, scooter, or skateboard  Helmets help decrease your risk of a serious head injury  Talk to your healthcare provider about other ways you can protect yourself if you play sports  · Wear your seat belt every time you are in a car  This helps to decrease your risk for a head injury if you are in a car accident    Follow up with your healthcare provider as directed:  Write down your questions so you remember to ask them during your visits  © 2017 [address] [employer] Information is for End User's use only and may not be sold, redistributed or otherwise used for commercial purposes  All illustrations and images included in CareNotes® are the copyrighted property of [employer]  or [employer]  The above information is an  only  It is not intended as medical advice for individual conditions or treatments  Talk to your doctor, nurse or pharmacist before following any medical regimen to see if it is safe and effective for you

## 2018-06-19 NOTE — ED NOTES
Called [facility] and attempted to make transport arrangements to send patient over for a cat scan secondary to her being unable to fit into our machine    [facility] is trying to make arrangements and will call back       Elva Contreras RN  06/18/18 8149

## 2018-06-19 NOTE — ED NOTES
Transport cancelled secondary to patient wanting to sign out [address] [place] [address] Marib[place], RN  06/18/18 0210

## 2018-06-28 ENCOUNTER — OFFICE VISIT (OUTPATIENT)
Dept: URGENT CARE | Facility: CLINIC | Age: 28
End: 2018-06-28
Payer: COMMERCIAL

## 2018-06-28 VITALS
SYSTOLIC BLOOD PRESSURE: 118 MMHG | HEART RATE: 92 BPM | BODY MASS INDEX: 44.41 KG/M2 | DIASTOLIC BLOOD PRESSURE: 60 MMHG | HEIGHT: 68 IN | TEMPERATURE: 97 F | WEIGHT: 293 LBS | OXYGEN SATURATION: 99 % | RESPIRATION RATE: 16 BRPM

## 2018-06-28 DIAGNOSIS — M72.2 PLANTAR FIBROMATOSIS: Primary | ICD-10-CM

## 2018-06-28 PROBLEM — M79.672 LEFT FOOT PAIN: Status: ACTIVE | Noted: 2018-06-28

## 2018-06-28 PROCEDURE — 99213 OFFICE O/P EST LOW 20 MIN: CPT | Performed by: FAMILY MEDICINE

## 2018-06-28 NOTE — PROGRESS NOTES
[facility]        NAME: Ursula Farooq is a 33 y o  female  : 1990    MRN: [id number]  DATE: 2018  TIME: 10:42 AM    Assessment and Plan   Left foot pain [M88 132]  1  Plantar fibromatosis           Patient Instructions     Patient Instructions   Clinical presenation appears to be consistent with a plantar fibroma, and likely resulted from the foot injury she had last month  I have instructed the patient to apply ice to the site and take Tylenol or Motrin as needed for pain  We have provided an ace wrap to wear for comfort and support as needed and I have instructed to wear comfortable shoes w/ good support  I have instructed the patient to follow up w/ podiatry for further evaluation and treatment, recommended Dr Isael Elizabeth, [place] [address], [place], [place],  [phone]  Follow up with PCP in 3-5 days  Proceed to  ER if symptoms worsen  Chief Complaint     Chief Complaint   Patient presents with    Foot Pain     left foot pain began 2 days ago with a constant sharp pain in instep  small hard nodule is present  History of Present Illness       32 yo F, presents c/o a painful knot on the bottom of her left foot x 2 days  She stepped on a shovel handle about 5 weeks ago, was seen in the ER, had a foot x-ray, it was negative, and was diagnosed w/ a foot sprain  She states the pain related to that resolved, however this new painful bump now appeared 2 days ago  She denies any cuts or open wounds on the foot  No numbness/tingling or burning sensation of the foot  No redness or bruising  Denies any new injuries related to that foot  She has pain in that spot when she walks and bears weight  No ankle pain/swelling  She has been taking Tylenol or Ibuprofen as needed  Review of Systems   Review of Systems   Constitutional: Negative  Musculoskeletal:        As noted in HPI   Skin:        As noted in HPI   Neurological: Negative            Current Medications Current Outpatient Prescriptions:     Norethindrone-Eth Estradiol (NORTREL 1/35, 28, PO), Take 2 tablets by mouth daily, Disp: , Rfl:     cyclobenzaprine (FLEXERIL) 10 mg tablet, Take 1 tablet (10 mg total) by mouth 3 (three) times a day as needed for muscle spasms for up to 10 days, Disp: 15 tablet, Rfl: 0    naproxen (NAPROSYN) 500 mg tablet, Take 1 tablet (500 mg total) by mouth 2 (two) times a day with meals, Disp: 10 tablet, Rfl: 0    Current Allergies     Allergies as of 06/28/2018 - Reviewed 06/28/2018   Allergen Reaction Noted    Oxycodone Itching     Zoloft [sertraline] Headache 01/05/2018            The following portions of the patient's history were reviewed and updated as appropriate: allergies, current medications, past family history, past medical history, past social history, past surgical history and problem list      Past Medical History:   Diagnosis Date    Anemia     Arthritis     Asthma     seasonal    DUB (dysfunctional uterine bleeding)     Iliotibial band syndrome of left side     Obesity        Past Surgical History:   Procedure Laterality Date    NO PAST SURGERIES      SC HYSTEROSCOPY,W/ENDO BX N/A 1/15/2018    Procedure: DILATATION AND CURETTAGE (D&C) WITH HYSTEROSCOPY;  Surgeon: Maninder Dunn MD;  [facility]: [facility];  Service: Gynecology       Family History   Problem Relation Age of Onset    Diabetes Mother     Skin cancer Mother     Diabetes Father          Medications have been verified  Objective   /60 (BP Location: Left arm, Patient Position: Sitting, Cuff Size: Adult)   Pulse 92   Temp (!) 97 °F (36 1 °C)   Resp 16   Ht 5' 8" (1 727 m)   Wt (!) 235 kg (519 lb)   LMP  (LMP Unknown)   SpO2 99%   BMI 78 91 kg/m²        Physical Exam     Physical Exam   Constitutional: She is oriented to person, place, and time  Vital signs are normal  She appears well-developed and well-nourished  She is active and cooperative  Non-toxic appearance  She does not have a sickly appearance  She does not appear ill  No distress  Musculoskeletal:   Left foot: poor hygiene noted  Dry cracked skin noted at heels otherwise skin is intact w/ no open wounds  No swelling, erythema, or bruising  There is ~1 cm palpable nodule in the middle of the plantar surface of the foot, it is tender to touch, non-fluctuant  Neurological: She is alert and oriented to person, place, and time  Skin: She is not diaphoretic  Psychiatric: She has a normal mood and affect  Her behavior is normal  Judgment and thought content normal    Nursing note and vitals reviewed

## 2018-06-28 NOTE — PATIENT INSTRUCTIONS
Clinical presenation appears to be consistent with a plantar fibroma, and likely resulted from the foot injury she had last month  I have instructed the patient to apply ice to the site and take Tylenol or Motrin as needed for pain  We have provided an ace wrap to wear for comfort and support as needed and I have instructed to wear comfortable shoes w/ good support  I have instructed the patient to follow up w/ podiatry for further evaluation and treatment, recommended Dr Ros Garcia[address],  [phone]

## 2018-08-03 ENCOUNTER — HOSPITAL ENCOUNTER (EMERGENCY)
Facility: HOSPITAL | Age: 28
Discharge: HOME | End: 2018-08-03
Attending: EMERGENCY MEDICINE
Payer: COMMERCIAL

## 2018-08-03 VITALS
BODY MASS INDEX: 43.4 KG/M2 | SYSTOLIC BLOOD PRESSURE: 166 MMHG | WEIGHT: 293 LBS | TEMPERATURE: 98.1 F | OXYGEN SATURATION: 96 % | DIASTOLIC BLOOD PRESSURE: 92 MMHG | HEIGHT: 69 IN | RESPIRATION RATE: 18 BRPM | HEART RATE: 107 BPM

## 2018-08-03 DIAGNOSIS — M54.16 LUMBAR RADICULOPATHY: Primary | ICD-10-CM

## 2018-08-03 PROCEDURE — 96372 THER/PROPH/DIAG INJ SC/IM: CPT

## 2018-08-03 PROCEDURE — 99283 EMERGENCY DEPT VISIT LOW MDM: CPT

## 2018-08-03 RX ORDER — NAPROXEN 500 MG/1
500 TABLET ORAL 2 TIMES DAILY WITH MEALS
Qty: 20 TABLET | Refills: 0 | Status: SHIPPED | OUTPATIENT
Start: 2018-08-03 | End: 2018-12-14

## 2018-08-03 RX ORDER — CYCLOBENZAPRINE HCL 10 MG
10 TABLET ORAL 3 TIMES DAILY PRN
Qty: 12 TABLET | Refills: 0 | Status: SHIPPED | OUTPATIENT
Start: 2018-08-03 | End: 2018-12-14

## 2018-08-03 RX ORDER — KETOROLAC TROMETHAMINE 30 MG/ML
30 INJECTION, SOLUTION INTRAMUSCULAR; INTRAVENOUS ONCE
Status: DISCONTINUED | OUTPATIENT
Start: 2018-08-03 | End: 2018-08-03

## 2018-08-03 RX ORDER — KETOROLAC TROMETHAMINE 30 MG/ML
30 INJECTION, SOLUTION INTRAMUSCULAR; INTRAVENOUS ONCE
Status: COMPLETED | OUTPATIENT
Start: 2018-08-03 | End: 2018-08-03

## 2018-08-03 RX ADMIN — KETOROLAC TROMETHAMINE 30 MG: 30 INJECTION, SOLUTION INTRAMUSCULAR at 12:02

## 2018-08-03 NOTE — ED PROVIDER NOTES
History  Chief Complaint   Patient presents with    Back Pain     hurt back getting out of shower last night        34 y/o morbidly obese female, presenting with left lower back pain since last night after she was attempting to get out of the shower she placed to foot outside the tub and felt a pull in her lower back, she did not fall  Was able to walk afterwards  Needed assistance however  Took some ibuprofen for which alleviated some of the pain  Overall today feels that today the pain somewhat better  Has been ambulating without any difficulty  Pain radiates to the left buttock region  States she has chronic decreased sensation to the right leg  Denies new numbness, paresthesias, saddle anesthesia, changes in urination, fevers, nausea vomiting, abdominal pain  Prior to Admission Medications   Prescriptions Last Dose Informant Patient Reported? Taking? Norethindrone-Eth Estradiol (NORTREL 1/35, 28, PO)   Yes No   Sig: Take 2 tablets by mouth daily   naproxen (NAPROSYN) 500 mg tablet   No No   Sig: Take 1 tablet (500 mg total) by mouth 2 (two) times a day with meals      Facility-Administered Medications: None       Past Medical History:   Diagnosis Date    Anemia     Arthritis     Asthma     seasonal    DUB (dysfunctional uterine bleeding)     Iliotibial band syndrome of left side     Obesity        Past Surgical History:   Procedure Laterality Date    NO PAST SURGERIES      DC HYSTEROSCOPY,W/ENDO BX N/A 1/15/2018    Procedure: DILATATION AND CURETTAGE (D&C) WITH HYSTEROSCOPY;  Surgeon: Misbah Ferrer MD;  [facility];  Service: Gynecology       Family History   Problem Relation Age of Onset    Diabetes Mother     Skin cancer Mother     Diabetes Father      I have reviewed and agree with the history as documented      Social History   Substance Use Topics    Smoking status: Never Smoker    Smokeless tobacco: Never Used    Alcohol use Yes      Comment: occ Review of Systems   Constitutional: Negative  Negative for activity change, appetite change, chills, fever and unexpected weight change  Denies IV drug use     HENT: Negative  Eyes: Negative  Respiratory: Negative  Negative for cough, chest tightness, shortness of breath and wheezing  Cardiovascular: Negative  Negative for chest pain and palpitations  Gastrointestinal: Negative  Negative for abdominal pain, constipation, diarrhea, nausea and vomiting  Genitourinary: Negative  Negative for difficulty urinating, dysuria, flank pain, frequency, hematuria and urgency  Denies numbness, tingling in the groin  Musculoskeletal: Positive for back pain  Negative for arthralgias, gait problem, joint swelling, myalgias, neck pain and neck stiffness  Skin: Negative  Negative for color change  Neurological: Negative  Negative for dizziness, tremors, weakness, light-headedness, numbness and headaches  All other systems reviewed and are negative  Physical Exam  Physical Exam   Constitutional: She is oriented to person, place, and time  She appears well-developed and well-nourished  HENT:   Head: Normocephalic and atraumatic  Nose: Nose normal    Eyes: Conjunctivae are normal    Cardiovascular: Regular rhythm and intact distal pulses  Pulmonary/Chest: Effort normal  No respiratory distress  spo2 is 96% indicating adequate oxygenation  Abdominal: Soft  Bowel sounds are normal  She exhibits no distension and no mass  There is no tenderness  There is no rebound and no guarding  No hernia  Musculoskeletal:        Arms:  Neurological: She is alert and oriented to person, place, and time  Skin: Skin is warm and dry  Capillary refill takes less than 2 seconds  Nursing note and vitals reviewed        Vital Signs  ED Triage Vitals [08/03/18 1044]   Temperature Pulse Respirations Blood Pressure SpO2   98 1 °F (36 7 °C) (!) 107 18 166/92 96 %      Temp Source Heart Rate Source Patient Position - Orthostatic VS BP Location FiO2 (%)   Oral Monitor Sitting Right arm --      Pain Score       9           Vitals:    08/03/18 1044   BP: 166/92   Pulse: (!) 107   Patient Position - Orthostatic VS: Sitting       Visual Acuity      ED Medications  Medications   ketorolac (TORADOL) injection 30 mg (not administered)       Diagnostic Studies  Results Reviewed     None                 No orders to display              Procedures  Procedures       Phone Contacts  ED Phone Contact    ED Course                               MDM  Number of Diagnoses or Management Options  Diagnosis management comments: Will treat for muscle spasm and radiculopathy  Informed not to drive or operate heavy machinery while taking muscle relaxants  Patient is informed to return to the emergency department for worsening of symptoms and was given proper education regarding their diagnosis and symptoms  Otherwise the patient is informed to follow up with their primary care doctor for re-evaluation  The patient verbalizes understanding and agrees with above assessment and plan  All questions were answered  Please Note: Flu[employer] Direct voice recognition software may have been used in the creation of this document  Wrong words or sound a like substitutions may have occurred due to the inherent limitations of the voice software  Amount and/or Complexity of Data Reviewed  Review and summarize past medical records: yes  Independent visualization of images, tracings, or specimens: yes      CritCare Time    Disposition  Final diagnoses:   Lumbar radiculopathy     Time reflects when diagnosis was documented in both MDM as applicable and the Disposition within this note     Time User Action Codes Description Comment    8/3/2018 11:15 AM Flaco Small Add [M54 16] Lumbar radiculopathy       ED Disposition     ED Disposition Condition Comment    Discharge  Blanka Quintanilla discharge to home/self care      Condition at discharge: Good        Follow-up Information     Follow up With Specialties Details Why Contact Info Additional [phone] Emergency Department Emergency Medicine Go to If symptoms worsen [address] [facility]  [phone] [facility] ED, Gisela Arellano [place], [address]    Keira Koehler MD Family Medicine Schedule an appointment as soon as possible for a visit As needed [address] [facility] Male [address] [place] [address]  [phone]             Patient's Medications   Discharge Prescriptions    CYCLOBENZAPRINE (FLEXERIL) 10 MG TABLET    Take 1 tablet (10 mg total) by mouth 3 (three) times a day as needed for muscle spasms for up to 4 days       Start Date: 8/3/2018  End Date: 8/7/2018       Order Dose: 10 mg       Quantity: 12 tablet    Refills: 0    NAPROXEN (NAPROSYN) 500 MG TABLET    Take 1 tablet (500 mg total) by mouth 2 (two) times a day with meals for 10 days       Start Date: 8/3/2018  End Date: 8/13/2018       Order Dose: 500 mg       Quantity: 20 tablet    Refills: 0     No discharge procedures on file      ED Provider  Electronically Signed by           Lambert Mauro PA-C  08/03/18 8704

## 2018-08-03 NOTE — DISCHARGE INSTRUCTIONS
Lumbar Radiculopathy   WHAT YOU NEED TO KNOW:   Lumbar radiculopathy is a painful condition that happens when a nerve in your lumbar spine (lower back) is pinched or irritated  Nerves control feeling and movement in your body  You may have numbness or pain that shoots down from your lower back towards your foot  DISCHARGE INSTRUCTIONS:   Medicines:   · Medicines:     ¨ NSAIDs , such as ibuprofen, help decrease swelling, pain, and fever  This medicine is available with or without a doctor's order  NSAIDs can cause stomach bleeding or kidney problems in certain people  If you take blood thinner medicine, always ask your healthcare provider if NSAIDs are safe for you  Always read the medicine label and follow directions  ¨ Muscle relaxers  help decrease pain and muscle spasms  ¨ Opioids: This is a strong medicine given to reduce severe pain  It is also called narcotic pain medicine  Take this medicine exactly as directed by your healthcare provider  ¨ Oral steroids: Steroids may also be given to reduce pain and swelling  ¨ Take your medicine as directed  Contact your healthcare provider if you think your medicine is not helping or if you have side effects  Tell him of her if you are allergic to any medicine  Keep a list of the medicines, vitamins, and herbs you take  Include the amounts, and when and why you take them  Bring the list or the pill bottles to follow-up visits  Carry your medicine list with you in case of an emergency  Follow up with your healthcare provider or spine specialist within 1 to 3 weeks:  After your first follow-up appointment, return to your healthcare provider or spine specialist every 2 weeks until you have healed  Ask for information about physical therapy for your condition  Write down your questions so you remember to ask them during your visits  Physical therapy:  You may need physical therapy to improve your condition   Your physical therapist may teach you certain exercises to improve posture (the way you stand and sit), flexibility, and strength in your lower back  Self care:   · Stay active: It is best to be active when you have lumbar radiculopathy  Your physical therapist or healthcare provider may tell you to take walks to ease yourself back into your daily routine  Avoid long periods of bed rest  Bed rest could worsen your symptoms  Do not move in ways that increase your pain  Ask for more information about the best ways to stay active  · Use ice or heat packs:  Use ice or heat packs as directed on the sore area of your body to decrease the pain and swelling  Put ice in a plastic bag covered with a towel on your low back  Cover heated items with a towel to avoid burns  Use ice and heat as directed  · Avoid heavy lifting: Your condition may worsen if you lift heavy things  Avoid lifting if possible  · Maintain a healthy weight:  Excess body weight may strain your back  Talk with your healthcare provider about ways to lose excess weight if you are overweight  Contact your healthcare provider or spine specialist if:   · Your pain does not improve within 1 to 3 weeks after treatment  · Your pain and weakness keep you from your normal activities at work, home, or school  · You lose more than 10 pounds in 6 months without trying  · You become depressed or sad because of the pain  · You have questions or concerns about your condition or care  Return to the emergency department if:   · You have a fever greater than 100 4°F for longer than 2 days  · You have new, severe back or leg pain, or your pain spreads to both legs  · You have any new signs of numbness or weakness, especially in your lower back, legs, arms, or genital area  · You have new trouble controlling your urine and bowel movements  · You do not feel like your bladder empties when you urinate    © 2017 [address] [employer] Information is for End User's use only and may not be sold, redistributed or otherwise used for commercial purposes  All illustrations and images included in CareNotes® are the copyrighted property of [employer]  or [employer]  The above information is an  only  It is not intended as medical advice for individual conditions or treatments  Talk to your doctor, nurse or pharmacist before following any medical regimen to see if it is safe and effective for you

## 2018-08-15 ENCOUNTER — TELEPHONE (OUTPATIENT)
Dept: OBGYN CLINIC | Facility: CLINIC | Age: 28
End: 2018-08-15

## 2018-08-15 NOTE — TELEPHONE ENCOUNTER
Spoke with Pt today via phone call  Pt states she wants to know date of last Pap smear on record  Pt informed that her most recent Pap smear on record was completed on 9/11/2014  Reiterated to Pt that if she has any questions/concerns, to contact office

## 2018-12-15 ENCOUNTER — HOSPITAL ENCOUNTER (EMERGENCY)
Facility: HOSPITAL | Age: 28
Discharge: HOME | End: 2018-12-15
Attending: EMERGENCY MEDICINE | Admitting: EMERGENCY MEDICINE
Payer: COMMERCIAL

## 2018-12-15 VITALS
HEART RATE: 88 BPM | SYSTOLIC BLOOD PRESSURE: 125 MMHG | DIASTOLIC BLOOD PRESSURE: 70 MMHG | OXYGEN SATURATION: 100 % | RESPIRATION RATE: 18 BRPM | TEMPERATURE: 98.6 F

## 2018-12-15 DIAGNOSIS — N12 PYELONEPHRITIS: Primary | ICD-10-CM

## 2018-12-15 LAB
ALBUMIN SERPL BCP-MCNC: 2.4 G/DL (ref 3.5–5)
ALP SERPL-CCNC: 110 U/L (ref 46–116)
ALT SERPL W P-5'-P-CCNC: 20 U/L (ref 12–78)
ANION GAP SERPL CALCULATED.3IONS-SCNC: 10 MMOL/L (ref 4–13)
AST SERPL W P-5'-P-CCNC: 44 U/L (ref 5–45)
BACTERIA UR QL AUTO: ABNORMAL /HPF
BASOPHILS # BLD AUTO: 0.03 THOUSANDS/ΜL (ref 0–0.1)
BASOPHILS NFR BLD AUTO: 0 % (ref 0–1)
BILIRUB SERPL-MCNC: 0.5 MG/DL (ref 0.2–1)
BILIRUB UR QL STRIP: NEGATIVE
BUN SERPL-MCNC: 8 MG/DL (ref 5–25)
CALCIUM SERPL-MCNC: 8.7 MG/DL (ref 8.3–10.1)
CHLORIDE SERPL-SCNC: 103 MMOL/L (ref 100–108)
CLARITY UR: CLEAR
CO2 SERPL-SCNC: 23 MMOL/L (ref 21–32)
COLOR UR: ABNORMAL
CREAT SERPL-MCNC: 0.64 MG/DL (ref 0.6–1.3)
EOSINOPHIL # BLD AUTO: 0.1 THOUSAND/ΜL (ref 0–0.61)
EOSINOPHIL NFR BLD AUTO: 1 % (ref 0–6)
ERYTHROCYTE [DISTWIDTH] IN BLOOD BY AUTOMATED COUNT: 15.4 % (ref 11.6–15.1)
EXT PREG TEST URINE: NEGATIVE
GFR SERPL CREATININE-BSD FRML MDRD: 123 ML/MIN/1.73SQ M
GLUCOSE SERPL-MCNC: 144 MG/DL (ref 65–140)
GLUCOSE UR STRIP-MCNC: NEGATIVE MG/DL
HCT VFR BLD AUTO: 37.7 % (ref 34.8–46.1)
HGB BLD-MCNC: 10.9 G/DL (ref 11.5–15.4)
HGB UR QL STRIP.AUTO: NEGATIVE
IMM GRANULOCYTES # BLD AUTO: 0.13 THOUSAND/UL (ref 0–0.2)
IMM GRANULOCYTES NFR BLD AUTO: 1 % (ref 0–2)
KETONES UR STRIP-MCNC: NEGATIVE MG/DL
LEUKOCYTE ESTERASE UR QL STRIP: NEGATIVE
LYMPHOCYTES # BLD AUTO: 2.49 THOUSANDS/ΜL (ref 0.6–4.47)
LYMPHOCYTES NFR BLD AUTO: 25 % (ref 14–44)
MCH RBC QN AUTO: 23.7 PG (ref 26.8–34.3)
MCHC RBC AUTO-ENTMCNC: 28.9 G/DL (ref 31.4–37.4)
MCV RBC AUTO: 82 FL (ref 82–98)
MONOCYTES # BLD AUTO: 0.43 THOUSAND/ΜL (ref 0.17–1.22)
MONOCYTES NFR BLD AUTO: 4 % (ref 4–12)
NEUTROPHILS # BLD AUTO: 6.79 THOUSANDS/ΜL (ref 1.85–7.62)
NEUTS SEG NFR BLD AUTO: 69 % (ref 43–75)
NITRITE UR QL STRIP: POSITIVE
NON-SQ EPI CELLS URNS QL MICRO: ABNORMAL /HPF
NRBC BLD AUTO-RTO: 0 /100 WBCS
PH UR STRIP.AUTO: 6 [PH] (ref 5–9)
PLATELET # BLD AUTO: 373 THOUSANDS/UL (ref 149–390)
PMV BLD AUTO: 11 FL (ref 8.9–12.7)
POTASSIUM SERPL-SCNC: 5.2 MMOL/L (ref 3.5–5.3)
PROT SERPL-MCNC: 7.5 G/DL (ref 6.4–8.2)
PROT UR STRIP-MCNC: NEGATIVE MG/DL
RBC # BLD AUTO: 4.6 MILLION/UL (ref 3.81–5.12)
RBC #/AREA URNS AUTO: ABNORMAL /HPF
SODIUM SERPL-SCNC: 136 MMOL/L (ref 136–145)
SP GR UR STRIP.AUTO: 1.02 (ref 1–1.03)
UROBILINOGEN UR QL STRIP.AUTO: 1 E.U./DL
WBC # BLD AUTO: 9.97 THOUSAND/UL (ref 4.31–10.16)
WBC #/AREA URNS AUTO: ABNORMAL /HPF

## 2018-12-15 PROCEDURE — 81025 URINE PREGNANCY TEST: CPT | Performed by: EMERGENCY MEDICINE

## 2018-12-15 PROCEDURE — 80053 COMPREHEN METABOLIC PANEL: CPT | Performed by: EMERGENCY MEDICINE

## 2018-12-15 PROCEDURE — 81001 URINALYSIS AUTO W/SCOPE: CPT | Performed by: EMERGENCY MEDICINE

## 2018-12-15 PROCEDURE — 99283 EMERGENCY DEPT VISIT LOW MDM: CPT

## 2018-12-15 PROCEDURE — 36415 COLL VENOUS BLD VENIPUNCTURE: CPT | Performed by: EMERGENCY MEDICINE

## 2018-12-15 PROCEDURE — 87086 URINE CULTURE/COLONY COUNT: CPT | Performed by: EMERGENCY MEDICINE

## 2018-12-15 PROCEDURE — 96375 TX/PRO/DX INJ NEW DRUG ADDON: CPT

## 2018-12-15 PROCEDURE — 96361 HYDRATE IV INFUSION ADD-ON: CPT

## 2018-12-15 PROCEDURE — 96374 THER/PROPH/DIAG INJ IV PUSH: CPT

## 2018-12-15 PROCEDURE — 85025 COMPLETE CBC W/AUTO DIFF WBC: CPT | Performed by: EMERGENCY MEDICINE

## 2018-12-15 RX ORDER — ONDANSETRON 4 MG/1
4 TABLET, ORALLY DISINTEGRATING ORAL EVERY 6 HOURS PRN
Qty: 20 TABLET | Refills: 0 | Status: SHIPPED | OUTPATIENT
Start: 2018-12-15 | End: 2019-12-30

## 2018-12-15 RX ORDER — CIPROFLOXACIN 500 MG/1
500 TABLET, FILM COATED ORAL 2 TIMES DAILY
Qty: 10 TABLET | Refills: 0 | Status: SHIPPED | OUTPATIENT
Start: 2018-12-15 | End: 2018-12-20

## 2018-12-15 RX ORDER — KETOROLAC TROMETHAMINE 30 MG/ML
15 INJECTION, SOLUTION INTRAMUSCULAR; INTRAVENOUS ONCE
Status: COMPLETED | OUTPATIENT
Start: 2018-12-15 | End: 2018-12-15

## 2018-12-15 RX ORDER — ONDANSETRON 2 MG/ML
4 INJECTION INTRAMUSCULAR; INTRAVENOUS ONCE
Status: COMPLETED | OUTPATIENT
Start: 2018-12-15 | End: 2018-12-15

## 2018-12-15 RX ORDER — CIPROFLOXACIN 500 MG/1
500 TABLET, FILM COATED ORAL ONCE
Status: COMPLETED | OUTPATIENT
Start: 2018-12-15 | End: 2018-12-15

## 2018-12-15 RX ORDER — ALBUTEROL SULFATE 90 UG/1
2 AEROSOL, METERED RESPIRATORY (INHALATION) EVERY 6 HOURS PRN
COMMUNITY
End: 2019-12-30 | Stop reason: SDUPTHER

## 2018-12-15 RX ADMIN — KETOROLAC TROMETHAMINE 15 MG: 30 INJECTION, SOLUTION INTRAMUSCULAR at 14:12

## 2018-12-15 RX ADMIN — CIPROFLOXACIN HYDROCHLORIDE 500 MG: 500 TABLET, FILM COATED ORAL at 14:23

## 2018-12-15 RX ADMIN — SODIUM CHLORIDE 1000 ML: 0.9 INJECTION, SOLUTION INTRAVENOUS at 14:08

## 2018-12-15 RX ADMIN — ONDANSETRON 4 MG: 2 INJECTION INTRAMUSCULAR; INTRAVENOUS at 15:02

## 2018-12-15 NOTE — DISCHARGE INSTRUCTIONS
Kidney Infection   WHAT YOU NEED TO KNOW:   A kidney infection, or pyelonephritis, is a bacterial infection  The infection usually starts in your bladder or urethra and moves into your kidney  One or both kidneys may be infected  DISCHARGE INSTRUCTIONS:   Seek care immediately if:   · You have a fever and chills  · You cannot stop vomiting  · You have severe pain in your abdomen, lower back, or sides  Contact your healthcare provider if:   · You continue to have a fever after you take antibiotics for 3 days  · You have pain when you urinate, even after treatment  · Your signs and symptoms return  · You have questions or concerns about your condition or care  Medicines: You may  need any of the following:  · Antibiotics  treat your bacterial infection  · Acetaminophen  decreases pain and fever  It is available without a doctor's order  Ask how much to take and how often to take it  Follow directions  Read the labels of all other medicines you are using to see if they also contain acetaminophen, or ask your doctor or pharmacist  Acetaminophen can cause liver damage if not taken correctly  Do not use more than 4 grams (4,000 milligrams) total of acetaminophen in one day  · NSAIDs , such as ibuprofen, help decrease swelling, pain, and fever  This medicine is available with or without a doctor's order  NSAIDs can cause stomach bleeding or kidney problems in certain people  If you take blood thinner medicine, always ask if NSAIDs are safe for you  Always read the medicine label and follow directions  Do not give these medicines to children under 10months of age without direction from your child's healthcare provider  · Prescription pain medicine  may be given  Ask how to take this medicine safely  · Take your medicine as directed  Contact your healthcare provider if you think your medicine is not helping or if you have side effects   Tell him of her if you are allergic to any medicine  Keep a list of the medicines, vitamins, and herbs you take  Include the amounts, and when and why you take them  Bring the list or the pill bottles to follow-up visits  Carry your medicine list with you in case of an emergency  Drink liquids as directed: You may need to drink extra liquids to help flush your kidneys and urinary system  Water is the best liquid to drink  Ask your healthcare provider how much liquid to drink each day and which liquids are best for you  Urinate as soon as you feel the urge: This will help flush bacteria from your urinary system  Do not wait or hold your urine for too long  Clean your genital area every day with soap and water:  Wipe from front to back after you urinate or have a bowel movement  Wear cotton underwear  Fabrics such as nylon and polyester can stay damp  This can increase your risk for infection  Urinate within 15 minutes after you have sex  Follow up with your healthcare provider as directed:  Write down your questions so you remember to ask them during your visits  © 2017 [facility] [facility] Information is for End User's use only and may not be sold, redistributed or otherwise used for commercial purposes  All illustrations and images included in CareNotes® are the copyrighted property of [employer]  or [employer]  The above information is an  only  It is not intended as medical advice for individual conditions or treatments  Talk to your doctor, nurse or pharmacist before following any medical regimen to see if it is safe and effective for you

## 2018-12-15 NOTE — ED PROVIDER NOTES
History  Chief Complaint   Patient presents with    Possible UTI     states dx with uti a couple of days  ago  put on bactrim and pyridium with no relief  back pain worse now has nauseated     HPI    33 year female that presents today with possible UTI  Patient states for the past couple days she has been having some frequency and urgency  She went to the doctor's about 3 days ago got Bactrim and Pyridium with no relief  Patient states now she has pain in her right flank  Also states she spiked a temperature yesterday of 101  She took Tylenol prior to arrival   States she feels nauseous as well  Cath denies any vaginal discharge  States she is currently not on her period  No other complaints no abdominal pain  No weakness numbness or tingling  33 year female that presents with possible UTI  Concern for possible eyelid  Will get basic labs  Check urine treat with antibiotics as needed  Prior to Admission Medications   Prescriptions Last Dose Informant Patient Reported? Taking?    albuterol (PROVENTIL HFA,VENTOLIN HFA) 90 mcg/act inhaler   Yes Yes   Sig: Inhale 2 puffs every 6 (six) hours as needed for wheezing   phenazopyridine (PYRIDIUM) 200 mg tablet  Self Yes No   Sig: Take 200 mg by mouth 3 (three) times a day with meals For 2 days   sulfamethoxazole-trimethoprim (BACTRIM DS) 800-160 mg per tablet  Self Yes No   Sig: Take 1 tablet by mouth every 12 (twelve) hours      Facility-Administered Medications: None       Past Medical History:   Diagnosis Date    Anemia     Arthritis     Asthma     seasonal    DUB (dysfunctional uterine bleeding)     Iliotibial band syndrome of left side     Obesity        Past Surgical History:   Procedure Laterality Date    NO PAST SURGERIES      [place] HYSTEROSCOPY,W/ENDO BX N/A 1/15/2018    Procedure: DILATATION AND CURETTAGE (D&C) WITH HYSTEROSCOPY;  Surgeon: Carol Kaplan MD;  Location: [facility];  Service: Gynecology       Family History   Problem Relation Age of Onset    Diabetes Mother     Skin cancer Mother     Diabetes Father      I have reviewed and agree with the history as documented  Social History   Substance Use Topics    Smoking status: Never Smoker    Smokeless tobacco: Never Used    Alcohol use Yes      Comment: occ        Review of Systems   Constitutional: Negative  Negative for diaphoresis and fever  HENT: Negative  Respiratory: Negative  Negative for cough, shortness of breath and wheezing  Cardiovascular: Negative  Negative for chest pain, palpitations and leg swelling  Gastrointestinal: Positive for nausea  Negative for abdominal distention, abdominal pain and vomiting  Genitourinary: Positive for dysuria and flank pain  Negative for vaginal bleeding, vaginal discharge and vaginal pain  Skin: Negative  Neurological: Negative  Psychiatric/Behavioral: Negative  All other systems reviewed and are negative  Physical Exam  Physical Exam   Constitutional: She is oriented to person, place, and time  She appears well-developed and well-nourished  No distress  HENT:   Head: Normocephalic and atraumatic  Eyes: Pupils are equal, round, and reactive to light  EOM are normal    Neck: Normal range of motion  Neck supple  Cardiovascular: Normal rate, regular rhythm and normal heart sounds  No murmur heard  Pulmonary/Chest: Effort normal and breath sounds normal  No respiratory distress  Abdominal: Soft  Bowel sounds are normal  She exhibits no distension  There is no tenderness  There is no rebound and no guarding  Musculoskeletal: Normal range of motion  She exhibits tenderness  Mild right flank pain   Neurological: She is alert and oriented to person, place, and time  Skin: Skin is warm and dry  She is not diaphoretic  Psychiatric: She has a normal mood and affect  Vitals reviewed        Vital Signs  ED Triage Vitals [12/15/18 1234]   Temperature Pulse Respirations Blood Pressure SpO2   98 6 °F (37 °C) 99 (!) 24 146/79 99 %      Temp Source Heart Rate Source Patient Position - Orthostatic VS BP Location FiO2 (%)   Tympanic Monitor Sitting Right arm --      Pain Score       8           Vitals:    12/15/18 1415 12/15/18 1430 12/15/18 1445 12/15/18 1528   BP: 151/78  149/72 125/70   Pulse:  88 92 88   Patient Position - Orthostatic VS:    Lying       Visual Acuity      ED Medications  Medications   ketorolac (TORADOL) injection 15 mg (15 mg Intravenous Given 12/15/18 1412)   sodium chloride 0 9 % bolus 1,000 mL (0 mL Intravenous Stopped 12/15/18 1528)   ciprofloxacin (CIPRO) tablet 500 mg (500 mg Oral Given 12/15/18 1423)   ondansetron (ZOFRAN) injection 4 mg (4 mg Intravenous Given 12/15/18 1502)       Diagnostic Studies  Results Reviewed     Procedure Component Value Units Date/Time    Urine culture [[id number]] Collected:  12/15/18 1321    Lab Status: In process Specimen:  Urine Updated:  12/15/18 1447    Comprehensive metabolic panel [[id number]]  (Abnormal) Collected:  12/15/18 1350    Lab Status:  Final result Specimen:  Blood from Arm, Right Updated:  12/15/18 1419     Sodium 136 mmol/L      Potassium 5 2 mmol/L      Chloride 103 mmol/L      CO2 23 mmol/L      ANION GAP 10 mmol/L      BUN 8 mg/dL      Creatinine 0 64 mg/dL      Glucose 144 (H) mg/dL      Calcium 8 7 mg/dL      AST 44 U/L      ALT 20 U/L      Alkaline Phosphatase 110 U/L      Total Protein 7 5 g/dL      Albumin 2 4 (L) g/dL      Total Bilirubin 0 50 mg/dL      eGFR 123 ml/min/1 73sq m     Narrative:         National Kidney Disease Education Program recommendations are as follows:  GFR calculation is accurate only with a steady state creatinine  Chronic Kidney disease less than 60 ml/min/1 73 sq  meters  Kidney failure less than 15 ml/min/1 73 sq  meters      CBC and differential [[id number]]  (Abnormal) Collected:  12/15/18 1350    Lab Status:  Final result Specimen:  Blood from Arm, Right Updated:  12/15/18 1357     WBC 9 97 Thousand/uL      RBC 4 60 Million/uL      Hemoglobin 10 9 (L) g/dL      Hematocrit 37 7 %      MCV 82 fL      MCH 23 7 (L) pg      MCHC 28 9 (L) g/dL      RDW 15 4 (H) %      MPV 11 0 fL      Platelets 943 Thousands/uL      nRBC 0 /100 WBCs      Neutrophils Relative 69 %      Immat GRANS % 1 %      Lymphocytes Relative 25 %      Monocytes Relative 4 %      Eosinophils Relative 1 %      Basophils Relative 0 %      Neutrophils Absolute 6 79 Thousands/µL      Immature Grans Absolute 0 13 Thousand/uL      Lymphocytes Absolute 2 49 Thousands/µL      Monocytes Absolute 0 43 Thousand/µL      Eosinophils Absolute 0 10 Thousand/µL      Basophils Absolute 0 03 Thousands/µL     Urine Microscopic [[id number]]  (Abnormal) Collected:  12/15/18 1321    Lab Status:  Final result Specimen:  Urine from Urine, Clean Catch Updated:  12/15/18 1357     RBC, UA None Seen /hpf      WBC, UA 1-2 (A) /hpf      Epithelial Cells None Seen /hpf      Bacteria, UA Occasional /hpf     UA w Reflex to Microscopic [[id number]]  (Abnormal) Collected:  12/15/18 1321    Lab Status:  Final result Specimen:  Urine from Urine, Clean Catch Updated:  12/15/18 1328     Color, UA Orange     Clarity, UA Clear     Specific Gravity, UA 1 025     pH, UA 6 0     Leukocytes, UA Negative     Nitrite, UA Positive (A)     Protein, UA Negative mg/dl      Glucose, UA Negative mg/dl      Ketones, UA Negative mg/dl      Urobilinogen, UA 1 0 E U /dl      Bilirubin, UA Negative     Blood, UA Negative    POCT pregnancy, urine [[id number]]  (Normal) Resulted:  12/15/18 1321    Lab Status:  Final result Updated:  12/15/18 1321     EXT PREG TEST UR (Ref: Negative) negative                 No orders to display              Procedures  Procedures       Phone Contacts  ED Phone Contact    ED Course                               MDM  CritCare Time    Disposition  Final diagnoses:   Pyelonephritis - right     Time reflects when diagnosis was documented in both MDM as applicable and the Disposition within this note     Time User Action Codes Description Comment    12/15/2018  2:50 PM Syliva Earing Add [N12] Pyelonephritis     12/15/2018  2:50 PM Syliva Earing Modify [N12] Pyelonephritis right      ED Disposition     ED Disposition Condition Comment    Discharge  Simone Landa discharge to home/self care  Condition at discharge: Good        Follow-up Information     Follow up With Specialties Details Why Contact Info    Misty Hampton MD Family Medicine Schedule an appointment as soon as possible for a visit  [id number] [facility] Male [address] [place] [address]  [phone]            Discharge Medication List as of 12/15/2018  2:51 PM      START taking these medications    Details   ciprofloxacin (CIPRO) 500 mg tablet Take 1 tablet (500 mg total) by mouth 2 (two) times a day for 5 days, Starting Sat 12/15/2018, Until u 12/20/2018, Print      ondansetron (ZOFRAN-ODT) 4 mg disintegrating tablet Take 1 tablet (4 mg total) by mouth every 6 (six) hours as needed for nausea or vomiting, Starting Sat 12/15/2018, Print         CONTINUE these medications which have NOT CHANGED    Details   albuterol (PROVENTIL HFA,VENTOLIN HFA) 90 mcg/act inhaler Inhale 2 puffs every 6 (six) hours as needed for wheezing, Historical Med      phenazopyridine (PYRIDIUM) 200 mg tablet Take 200 mg by mouth 3 (three) times a day with meals For 2 days, Historical Med      sulfamethoxazole-trimethoprim (BACTRIM DS) 800-160 mg per tablet Take 1 tablet by mouth every 12 (twelve) hours, Historical Med           No discharge procedures on file      ED Provider  Electronically Signed by           Pete Nance MD  12/16/18 0078

## 2018-12-16 LAB — BACTERIA UR CULT: NORMAL

## 2019-02-11 ENCOUNTER — HOSPITAL ENCOUNTER (EMERGENCY)
Facility: HOSPITAL | Age: 29
Discharge: HOME | End: 2019-02-11
Attending: EMERGENCY MEDICINE | Admitting: EMERGENCY MEDICINE
Payer: COMMERCIAL

## 2019-02-11 VITALS
HEART RATE: 101 BPM | DIASTOLIC BLOOD PRESSURE: 79 MMHG | SYSTOLIC BLOOD PRESSURE: 142 MMHG | RESPIRATION RATE: 18 BRPM | TEMPERATURE: 97 F | OXYGEN SATURATION: 100 %

## 2019-02-11 DIAGNOSIS — N93.8 DYSFUNCTIONAL UTERINE BLEEDING: Primary | ICD-10-CM

## 2019-02-11 LAB
ANION GAP SERPL CALCULATED.3IONS-SCNC: 8 MMOL/L (ref 4–13)
APTT PPP: 32 SECONDS (ref 24–33)
BACTERIA UR QL AUTO: ABNORMAL /HPF
BASOPHILS # BLD AUTO: 0.03 THOUSANDS/ΜL (ref 0–0.1)
BASOPHILS NFR BLD AUTO: 0 % (ref 0–1)
BILIRUB UR QL STRIP: NEGATIVE
BUN SERPL-MCNC: 8 MG/DL (ref 5–25)
CALCIUM SERPL-MCNC: 9.2 MG/DL (ref 8.3–10.1)
CHLORIDE SERPL-SCNC: 102 MMOL/L (ref 100–108)
CLARITY UR: CLEAR
CO2 SERPL-SCNC: 28 MMOL/L (ref 21–32)
COLOR UR: ABNORMAL
CREAT SERPL-MCNC: 0.69 MG/DL (ref 0.6–1.3)
EOSINOPHIL # BLD AUTO: 0.08 THOUSAND/ΜL (ref 0–0.61)
EOSINOPHIL NFR BLD AUTO: 1 % (ref 0–6)
ERYTHROCYTE [DISTWIDTH] IN BLOOD BY AUTOMATED COUNT: 15.2 % (ref 11.6–15.1)
EXT PREG TEST URINE: NEGATIVE
GFR SERPL CREATININE-BSD FRML MDRD: 120 ML/MIN/1.73SQ M
GLUCOSE SERPL-MCNC: 95 MG/DL (ref 65–140)
GLUCOSE UR STRIP-MCNC: NEGATIVE MG/DL
HCT VFR BLD AUTO: 37.7 % (ref 34.8–46.1)
HGB BLD-MCNC: 10.8 G/DL (ref 11.5–15.4)
HGB UR QL STRIP.AUTO: ABNORMAL
IMM GRANULOCYTES # BLD AUTO: 0.18 THOUSAND/UL (ref 0–0.2)
IMM GRANULOCYTES NFR BLD AUTO: 2 % (ref 0–2)
INR PPP: 0.95 (ref 0.86–1.16)
KETONES UR STRIP-MCNC: NEGATIVE MG/DL
LEUKOCYTE ESTERASE UR QL STRIP: NEGATIVE
LYMPHOCYTES # BLD AUTO: 2.73 THOUSANDS/ΜL (ref 0.6–4.47)
LYMPHOCYTES NFR BLD AUTO: 26 % (ref 14–44)
MCH RBC QN AUTO: 23.1 PG (ref 26.8–34.3)
MCHC RBC AUTO-ENTMCNC: 28.6 G/DL (ref 31.4–37.4)
MCV RBC AUTO: 81 FL (ref 82–98)
MONOCYTES # BLD AUTO: 0.63 THOUSAND/ΜL (ref 0.17–1.22)
MONOCYTES NFR BLD AUTO: 6 % (ref 4–12)
NEUTROPHILS # BLD AUTO: 6.9 THOUSANDS/ΜL (ref 1.85–7.62)
NEUTS SEG NFR BLD AUTO: 65 % (ref 43–75)
NITRITE UR QL STRIP: NEGATIVE
NON-SQ EPI CELLS URNS QL MICRO: ABNORMAL /HPF
NRBC BLD AUTO-RTO: 0 /100 WBCS
PH UR STRIP.AUTO: 7.5 [PH] (ref 5–9)
PLATELET # BLD AUTO: 330 THOUSANDS/UL (ref 149–390)
PMV BLD AUTO: 9.1 FL (ref 8.9–12.7)
POTASSIUM SERPL-SCNC: 4.2 MMOL/L (ref 3.5–5.3)
PROT UR STRIP-MCNC: NEGATIVE MG/DL
PROTHROMBIN TIME: 10 SECONDS (ref 9.4–11.7)
RBC # BLD AUTO: 4.67 MILLION/UL (ref 3.81–5.12)
RBC #/AREA URNS AUTO: ABNORMAL /HPF
SODIUM SERPL-SCNC: 138 MMOL/L (ref 136–145)
SP GR UR STRIP.AUTO: 1.01 (ref 1–1.03)
UROBILINOGEN UR QL STRIP.AUTO: 0.2 E.U./DL
WBC # BLD AUTO: 10.55 THOUSAND/UL (ref 4.31–10.16)
WBC #/AREA URNS AUTO: ABNORMAL /HPF

## 2019-02-11 PROCEDURE — 85025 COMPLETE CBC W/AUTO DIFF WBC: CPT | Performed by: EMERGENCY MEDICINE

## 2019-02-11 PROCEDURE — 85610 PROTHROMBIN TIME: CPT | Performed by: EMERGENCY MEDICINE

## 2019-02-11 PROCEDURE — 85730 THROMBOPLASTIN TIME PARTIAL: CPT | Performed by: EMERGENCY MEDICINE

## 2019-02-11 PROCEDURE — 81025 URINE PREGNANCY TEST: CPT | Performed by: EMERGENCY MEDICINE

## 2019-02-11 PROCEDURE — 99284 EMERGENCY DEPT VISIT MOD MDM: CPT

## 2019-02-11 PROCEDURE — 36415 COLL VENOUS BLD VENIPUNCTURE: CPT | Performed by: EMERGENCY MEDICINE

## 2019-02-11 PROCEDURE — 80048 BASIC METABOLIC PNL TOTAL CA: CPT | Performed by: EMERGENCY MEDICINE

## 2019-02-11 PROCEDURE — 81001 URINALYSIS AUTO W/SCOPE: CPT | Performed by: EMERGENCY MEDICINE

## 2019-02-11 NOTE — ED PROVIDER NOTES
History  Chief Complaint   Patient presents with    Vaginal Bleeding     has had vaginal bleeding since january 15  states she is saturating 2 pads per hour  Hx ANEMIA, DUB, MORBIDLY OBESE  C/O VAG BL X ONE MONTH  GYN AT [address] [place] [address]  HAD LABS AND US, TRANS-ABD AND TRANS-VAG LAST WK AT [address] [place] [address], WAS NEG  C/O VAG BL      History provided by:  Patient  Vaginal Bleeding   Quality:  Unable to specify  Severity:  Moderate  Onset quality:  Unable to specify  Duration:  1 month  Timing:  Constant  Progression:  Worsening  Chronicity:  Recurrent  Relieved by:  None tried  Worsened by:  Nothing  Ineffective treatments:  None tried      Prior to Admission Medications   Prescriptions Last Dose Informant Patient Reported? Taking? albuterol (PROVENTIL HFA,VENTOLIN HFA) 90 mcg/act inhaler Past Week at Unknown time  Yes Yes   Sig: Inhale 2 puffs every 6 (six) hours as needed for wheezing   ondansetron (ZOFRAN-ODT) 4 mg disintegrating tablet Not Taking at Unknown time  No No   Sig: Take 1 tablet (4 mg total) by mouth every 6 (six) hours as needed for nausea or vomiting   Patient not taking: Reported on 2/11/2019      Facility-Administered Medications: None       Past Medical History:   Diagnosis Date    Anemia     Arthritis     Asthma     seasonal    DUB (dysfunctional uterine bleeding)     Iliotibial band syndrome of left side     Obesity        Past Surgical History:   Procedure Laterality Date    NO PAST SURGERIES      [place] HYSTEROSCOPY,W/ENDO BX N/A 1/15/2018    Procedure: DILATATION AND CURETTAGE (D&C) WITH HYSTEROSCOPY;  Surgeon: Townsend Schlatter, MD;  Location:  MAIN OR;  Service: Gynecology       Family History   Problem Relation Age of Onset    Diabetes Mother     Skin cancer Mother     Diabetes Father      I have reviewed and agree with the history as documented  Social History     Tobacco Use    Smoking status: Never Smoker    Smokeless tobacco: Never Used   Substance Use Topics    Alcohol use:  Yes Comment: occ    Drug use: No        Review of Systems   Genitourinary: Positive for vaginal bleeding  All other systems reviewed and are negative  Physical Exam  Physical Exam   Constitutional: She is oriented to person, place, and time  She appears well-developed and well-nourished  No distress  Cardiovascular: Normal rate and regular rhythm  Pulmonary/Chest: Effort normal and breath sounds normal    Abdominal: Soft  There is no tenderness  Neurological: She is alert and oriented to person, place, and time  Skin: Skin is warm and dry  She is not diaphoretic  Nursing note and vitals reviewed        Vital Signs  ED Triage Vitals [02/11/19 1530]   Temperature Pulse Respirations Blood Pressure SpO2   (!) 97 °F (36 1 °C) 104 22 142/89 100 %      Temp src Heart Rate Source Patient Position - Orthostatic VS BP Location FiO2 (%)   -- -- -- -- --      Pain Score       No Pain           Vitals:    02/11/19 1530   BP: 142/89   Pulse: 104       Visual Acuity      ED Medications  Medications - No data to display    Diagnostic Studies  Results Reviewed     Procedure Component Value Units Date/Time    Urine Microscopic [[id number]]  (Abnormal) Collected:  02/11/19 1630    Lab Status:  Final result Specimen:  Urine, Other Updated:  02/11/19 1653     RBC, UA 20-30 /hpf      WBC, UA 1-2 /hpf      Epithelial Cells Occasional /hpf      Bacteria, UA Occasional /hpf     APTT [[id number]]  (Normal) Collected:  02/11/19 1625    Lab Status:  Final result Specimen:  Blood from Arm, Right Updated:  02/11/19 1651     PTT 32 seconds     Protime-INR [[id number]]  (Normal) Collected:  02/11/19 1625    Lab Status:  Final result Specimen:  Blood from Arm, Right Updated:  02/11/19 1651     Protime 10 0 seconds      INR 9 20    Basic metabolic panel [[id number]] Collected:  02/11/19 1625    Lab Status:  Final result Specimen:  Blood from Arm, Right Updated:  02/11/19 1646     Sodium 138 mmol/L      Potassium 4 2 mmol/L      Chloride 102 mmol/L      CO2 28 mmol/L      ANION GAP 8 mmol/L      BUN 8 mg/dL      Creatinine 0 69 mg/dL      Glucose 95 mg/dL      Calcium 9 2 mg/dL      eGFR 120 ml/min/1 73sq m     Narrative:       National Kidney Disease Education Program recommendations are as follows:  GFR calculation is accurate only with a steady state creatinine  Chronic Kidney disease less than 60 ml/min/1 73 sq  meters  Kidney failure less than 15 ml/min/1 73 sq  meters      Urinalysis with reflex to microscopic [[id number]]  (Abnormal) Collected:  02/11/19 1630    Lab Status:  Final result Specimen:  Urine, Other Updated:  02/11/19 1639     Color, UA Selin     Clarity, UA Clear     Specific Gravity, UA 1 015     pH, UA 7 5     Leukocytes, UA Negative     Nitrite, UA Negative     Protein, UA Negative mg/dl      Glucose, UA Negative mg/dl      Ketones, UA Negative mg/dl      Urobilinogen, UA 0 2 E U /dl      Bilirubin, UA Negative     Blood, UA Large    POCT pregnancy, urine [[id number]]  (Normal) Resulted:  02/11/19 1636    Lab Status:  Final result Updated:  02/11/19 1637     EXT PREG TEST UR (Ref: Negative) Negative    CBC and differential [[id number]]  (Abnormal) Collected:  02/11/19 1625    Lab Status:  Final result Specimen:  Blood from Arm, Right Updated:  02/11/19 1630     WBC 10 55 Thousand/uL      RBC 4 67 Million/uL      Hemoglobin 10 8 g/dL      Hematocrit 37 7 %      MCV 81 fL      MCH 23 1 pg      MCHC 28 6 g/dL      RDW 15 2 %      MPV 9 1 fL      Platelets 847 Thousands/uL      nRBC 0 /100 WBCs      Neutrophils Relative 65 %      Immat GRANS % 2 %      Lymphocytes Relative 26 %      Monocytes Relative 6 %      Eosinophils Relative 1 %      Basophils Relative 0 %      Neutrophils Absolute 6 90 Thousands/µL      Immature Grans Absolute 0 18 Thousand/uL      Lymphocytes Absolute 2 73 Thousands/µL      Monocytes Absolute 0 63 Thousand/µL      Eosinophils Absolute 0 08 Thousand/µL      Basophils Absolute 0 03 Thousands/µL                  No orders to display              Procedures  Procedures       Phone Contacts  ED Phone Contact    ED Course                               MDM    Disposition  Final diagnoses:   None     ED Disposition     None      Follow-up Information    None         Patient's Medications   Discharge Prescriptions    No medications on file     No discharge procedures on file      ED Provider  Electronically Signed by           Booker Erickson MD  02/11/19 7874

## 2019-03-16 ENCOUNTER — APPOINTMENT (OUTPATIENT)
Dept: URGENT CARE | Facility: MEDICAL CENTER | Age: 29
End: 2019-03-16
Payer: OTHER MISCELLANEOUS

## 2019-03-16 PROCEDURE — G0382 LEV 3 HOSP TYPE B ED VISIT: HCPCS | Performed by: PHYSICIAN ASSISTANT

## 2019-03-16 PROCEDURE — 99283 EMERGENCY DEPT VISIT LOW MDM: CPT | Performed by: PHYSICIAN ASSISTANT

## 2019-03-21 ENCOUNTER — APPOINTMENT (OUTPATIENT)
Dept: URGENT CARE | Facility: MEDICAL CENTER | Age: 29
End: 2019-03-21
Payer: OTHER MISCELLANEOUS

## 2019-03-21 PROCEDURE — 99213 OFFICE O/P EST LOW 20 MIN: CPT | Performed by: PHYSICIAN ASSISTANT

## 2019-03-25 ENCOUNTER — HOSPITAL ENCOUNTER (EMERGENCY)
Facility: HOSPITAL | Age: 29
Discharge: HOME | End: 2019-03-25
Attending: EMERGENCY MEDICINE | Admitting: EMERGENCY MEDICINE
Payer: COMMERCIAL

## 2019-03-25 ENCOUNTER — APPOINTMENT (OUTPATIENT)
Dept: URGENT CARE | Facility: MEDICAL CENTER | Age: 29
End: 2019-03-25
Payer: OTHER MISCELLANEOUS

## 2019-03-25 ENCOUNTER — APPOINTMENT (EMERGENCY)
Dept: RADIOLOGY | Facility: HOSPITAL | Age: 29
End: 2019-03-25
Payer: COMMERCIAL

## 2019-03-25 VITALS
TEMPERATURE: 98.9 F | HEIGHT: 69 IN | WEIGHT: 293 LBS | DIASTOLIC BLOOD PRESSURE: 86 MMHG | BODY MASS INDEX: 43.4 KG/M2 | HEART RATE: 110 BPM | OXYGEN SATURATION: 98 % | SYSTOLIC BLOOD PRESSURE: 139 MMHG | RESPIRATION RATE: 18 BRPM

## 2019-03-25 DIAGNOSIS — M54.9 BACK PAIN: Primary | ICD-10-CM

## 2019-03-25 LAB
BACTERIA UR QL AUTO: ABNORMAL /HPF
BILIRUB UR QL STRIP: NEGATIVE
CLARITY UR: ABNORMAL
COLOR UR: ABNORMAL
EXT PREG TEST URINE: NEGATIVE
GLUCOSE UR STRIP-MCNC: NEGATIVE MG/DL
HGB UR QL STRIP.AUTO: ABNORMAL
KETONES UR STRIP-MCNC: NEGATIVE MG/DL
LEUKOCYTE ESTERASE UR QL STRIP: ABNORMAL
NITRITE UR QL STRIP: NEGATIVE
NON-SQ EPI CELLS URNS QL MICRO: ABNORMAL /HPF
PH UR STRIP.AUTO: 6 [PH]
PROT UR STRIP-MCNC: NEGATIVE MG/DL
RBC #/AREA URNS AUTO: ABNORMAL /HPF
SP GR UR STRIP.AUTO: 1.02 (ref 1–1.03)
UROBILINOGEN UR QL STRIP.AUTO: 0.2 E.U./DL
WBC #/AREA URNS AUTO: ABNORMAL /HPF

## 2019-03-25 PROCEDURE — 99283 EMERGENCY DEPT VISIT LOW MDM: CPT

## 2019-03-25 PROCEDURE — 72100 X-RAY EXAM L-S SPINE 2/3 VWS: CPT

## 2019-03-25 PROCEDURE — 96374 THER/PROPH/DIAG INJ IV PUSH: CPT

## 2019-03-25 PROCEDURE — 81025 URINE PREGNANCY TEST: CPT | Performed by: PHYSICIAN ASSISTANT

## 2019-03-25 PROCEDURE — 81001 URINALYSIS AUTO W/SCOPE: CPT | Performed by: PHYSICIAN ASSISTANT

## 2019-03-25 PROCEDURE — 99213 OFFICE O/P EST LOW 20 MIN: CPT

## 2019-03-25 RX ORDER — PREDNISONE 20 MG/1
40 TABLET ORAL ONCE
Status: COMPLETED | OUTPATIENT
Start: 2019-03-25 | End: 2019-03-25

## 2019-03-25 RX ORDER — ACETAMINOPHEN AND CODEINE PHOSPHATE 120; 12 MG/5ML; MG/5ML
1 SOLUTION ORAL DAILY
COMMUNITY
End: 2019-12-30

## 2019-03-25 RX ORDER — IBUPROFEN 800 MG/1
TABLET ORAL EVERY 6 HOURS PRN
COMMUNITY
End: 2019-12-30

## 2019-03-25 RX ORDER — CYCLOBENZAPRINE HCL 10 MG
10 TABLET ORAL 3 TIMES DAILY PRN
COMMUNITY
End: 2019-12-30

## 2019-03-25 RX ORDER — PREDNISONE 20 MG/1
40 TABLET ORAL DAILY
Qty: 10 TABLET | Refills: 0 | Status: SHIPPED | OUTPATIENT
Start: 2019-03-25 | End: 2019-03-30

## 2019-03-25 RX ORDER — KETOROLAC TROMETHAMINE 30 MG/ML
30 INJECTION, SOLUTION INTRAMUSCULAR; INTRAVENOUS ONCE
Status: COMPLETED | OUTPATIENT
Start: 2019-03-25 | End: 2019-03-25

## 2019-03-25 RX ADMIN — PREDNISONE 40 MG: 20 TABLET ORAL at 11:48

## 2019-03-25 RX ADMIN — KETOROLAC TROMETHAMINE 30 MG: 30 INJECTION, SOLUTION INTRAMUSCULAR at 12:00

## 2019-03-25 NOTE — ED PROVIDER NOTES
History  Chief Complaint   Patient presents with    Back Pain     Pt c/o lower back pain radaiting to the left buttocks/hip  Vernard Fix a few weeks ago and has pain since  Pt seen for back injury and currently on flexeril and ibuprofen with no relief  33year old female hx herniated disc, obesity presents with back pain x1 month  She had a fall earlier this month, she slipped on ice and landed on her butt  She has had back pain since which she has been managing with ibuprofen  She started physical therapy recently which triggered her back pain further over the past week  She has tried flexeril and ibuprofen with minimal relief  No IVDU, no saddle anesthesia, no urinary or bowel incontinence  Patient is currently menstruating  No other symptoms  No fever, chills, chest pain, shortness of breath, difficulty breathing, headache, dizziness, lightheadedness, weakness  No UTI symptoms such as burning, urgency, frequency, or hesitancy  Patient states she frequently gets UTIs however is asymptomatic at this time  Prior to Admission Medications   Prescriptions Last Dose Informant Patient Reported? Taking?    IRON PO   Yes Yes   Sig: Take 65 mg by mouth daily   albuterol (PROVENTIL HFA,VENTOLIN HFA) 90 mcg/act inhaler   Yes Yes   Sig: Inhale 2 puffs every 6 (six) hours as needed for wheezing   cyclobenzaprine (FLEXERIL) 10 mg tablet 3/25/2019 at 0500  Yes Yes   Sig: Take 10 mg by mouth 3 (three) times a day as needed for muscle spasms   ibuprofen (MOTRIN) 800 mg tablet 3/25/2019 at 0500  Yes Yes   Sig: Take by mouth every 6 (six) hours as needed for mild pain   norethindrone (MICRONOR) 0 35 MG tablet   Yes Yes   Sig: Take 1 tablet by mouth daily   ondansetron (ZOFRAN-ODT) 4 mg disintegrating tablet   No Yes   Sig: Take 1 tablet (4 mg total) by mouth every 6 (six) hours as needed for nausea or vomiting      Facility-Administered Medications: None       Past Medical History:   Diagnosis Date    Anemia     Arthritis  Asthma     seasonal    DUB (dysfunctional uterine bleeding)     Iliotibial band syndrome of left side     Obesity     Ruptured lumbar disc        Past Surgical History:   Procedure Laterality Date    NO PAST SURGERIES      [place] HYSTEROSCOPY,W/ENDO BX N/A 1/15/2018    Procedure: DILATATION AND CURETTAGE (D&C) WITH HYSTEROSCOPY;  Surgeon: Eren Lazo MD;  [facility];  Service: Gynecology       Family History   Problem Relation Age of Onset    Diabetes Mother     Skin cancer Mother     Diabetes Father      I have reviewed and agree with the history as documented  Social History     Tobacco Use    Smoking status: Never Smoker    Smokeless tobacco: Never Used   Substance Use Topics    Alcohol use: Yes     Comment: occ    Drug use: No        Review of Systems   Constitutional: Negative for chills and fever  HENT: Negative for sneezing and sore throat  Respiratory: Negative for cough and shortness of breath  Cardiovascular: Negative for chest pain, palpitations and leg swelling  Gastrointestinal: Negative for abdominal pain, constipation, diarrhea, nausea and vomiting  Genitourinary: Negative for decreased urine volume, difficulty urinating, dysuria, flank pain, frequency, hematuria, menstrual problem, pelvic pain, urgency, vaginal bleeding, vaginal discharge and vaginal pain  Musculoskeletal: Positive for back pain  Negative for gait problem and joint swelling  Skin: Negative for color change, pallor, rash and wound  Neurological: Negative for dizziness, syncope, weakness, light-headedness, numbness and headaches  All other systems reviewed and are negative  Physical Exam  Physical Exam   Constitutional: She appears well-developed and well-nourished  No distress  HENT:   Head: Normocephalic and atraumatic  Nose: Nose normal    Eyes: EOM are normal    Neck: Normal range of motion     Cardiovascular: Normal rate, regular rhythm, normal heart sounds and intact distal pulses  Exam reveals no gallop and no friction rub  No murmur heard  Pulmonary/Chest: Effort normal and breath sounds normal  No stridor  No respiratory distress  She has no wheezes  She has no rales  Sp02 is 98% indicating adequate oxygenation on room air   Musculoskeletal: She exhibits tenderness  She exhibits no edema or deformity  Back:    Skin: Skin is warm and dry  Capillary refill takes less than 2 seconds  No rash noted  She is not diaphoretic  No erythema  No pallor  Nursing note and vitals reviewed        Vital Signs  ED Triage Vitals [03/25/19 1057]   Temperature Pulse Respirations Blood Pressure SpO2   98 9 °F (37 2 °C) (!) 110 18 139/86 98 %      Temp Source Heart Rate Source Patient Position - Orthostatic VS BP Location FiO2 (%)   Tympanic Monitor Sitting Left arm --      Pain Score       Worst Possible Pain           Vitals:    03/25/19 1057   BP: 139/86   Pulse: (!) 110   Patient Position - Orthostatic VS: Sitting         Visual Acuity      ED Medications  Medications   ketorolac (TORADOL) injection 30 mg (30 mg Intravenous Given 3/25/19 1200)   predniSONE tablet 40 mg (40 mg Oral Given 3/25/19 1148)       Diagnostic Studies  Results Reviewed     Procedure Component Value Units Date/Time    Urine Microscopic [[id number]]  (Abnormal) Collected:  03/25/19 1154    Lab Status:  Final result Specimen:  Urine, Clean Catch Updated:  03/25/19 1220     RBC, UA Innumerable /hpf      WBC, UA 4-10 /hpf      Epithelial Cells Occasional /hpf      Bacteria, UA None Seen /hpf     POCT pregnancy, urine [[id number]]  (Normal) Resulted:  03/25/19 1200    Lab Status:  Final result Updated:  03/25/19 1200     EXT PREG TEST UR (Ref: Negative) negative    UA (URINE) with reflex to Microscopic [[id number]]  (Abnormal) Collected:  03/25/19 1154    Lab Status:  Final result Specimen:  Urine, Clean Catch Updated:  03/25/19 1200     Color, UA Light Yellow     Clarity, UA Slightly Cloudy     Specific Gravity, UA 1 025     pH, UA 6 0     Leukocytes, UA Trace     Nitrite, UA Negative     Protein, UA Negative mg/dl      Glucose, UA Negative mg/dl      Ketones, UA Negative mg/dl      Urobilinogen, UA 0 2 E U /dl      Bilirubin, UA Negative     Blood, UA Large                 XR spine lumbar 2 or 3 views injury   Final Result by Eva Sparks MD (03/25 1417)      Minimal disc space narrowing L4-L5 and L5-S1  Workstation performed: [id number]                    Procedures  Procedures       Phone Contacts  ED Phone Contact    ED Course                               MDM  Number of Diagnoses or Management Options  Back pain:   Diagnosis management comments: Patient's pain improved with toradol and prednisone  Will give short course of steroids and advised to continue flexeril and ibuprofen as prescribed  If pain persists, advised orthopedic follow up  Gave patient proper education regarding diagnosis  Answered all questions  Return to ED for any worsening of symptoms otherwise follow up with primary care physician for re-evaluation  Discussed plan with patient who verbalized understanding and agreed to plan  Amount and/or Complexity of Data Reviewed  Tests in the radiology section of CPT®: ordered and reviewed  Tests in the medicine section of CPT®: reviewed and ordered  Discussion of test results with the performing providers: yes  Review and summarize past medical records: yes  Discuss the patient with other providers: yes  Independent visualization of images, tracings, or specimens: yes        Disposition  Final diagnoses:   Back pain     Time reflects when diagnosis was documented in both MDM as applicable and the Disposition within this note     Time User Action Codes Description Comment    3/25/2019  1:07 PM Eduin Ordoñez Add [M54 9] Back pain       ED Disposition     ED Disposition Condition Date/Time Comment    Discharge Good Mon Mar 25, 2019  1:07 PM Tashi Gama discharge to home/self care  Follow-up Information     Follow up With Specialties Details Why Contact Info Additional Information    Susan Engle MD Family Medicine Schedule an appointment as soon as possible for a visit in 3 days If symptoms worsen [address] [facility] Emergency Medicine Go to  As needed [address] [facility] ED, [facility], [address] [place] [address], [place], [facility]          Discharge Medication List as of 3/25/2019  1:08 PM      START taking these medications    Details   predniSONE 20 mg tablet Take 2 tablets (40 mg total) by mouth daily for 5 days, Starting Mon 3/25/2019, Until Sat 3/30/2019, Print         CONTINUE these medications which have NOT CHANGED    Details   albuterol (PROVENTIL HFA,VENTOLIN HFA) 90 mcg/act inhaler Inhale 2 puffs every 6 (six) hours as needed for wheezing, Historical Med      cyclobenzaprine (FLEXERIL) 10 mg tablet Take 10 mg by mouth 3 (three) times a day as needed for muscle spasms, Historical Med      ibuprofen (MOTRIN) 800 mg tablet Take by mouth every 6 (six) hours as needed for mild pain, Historical Med      IRON PO Take 65 mg by mouth daily, Historical Med      norethindrone (MICRONOR) 0 35 MG tablet Take 1 tablet by mouth daily, Historical Med      ondansetron (ZOFRAN-ODT) 4 mg disintegrating tablet Take 1 tablet (4 mg total) by mouth every 6 (six) hours as needed for nausea or vomiting, Starting Sat 12/15/2018, Print           No discharge procedures on file      ED Provider  Electronically Signed by           Mariela Taylor PA-C  03/25/19 1946

## 2019-03-27 ENCOUNTER — APPOINTMENT (OUTPATIENT)
Dept: URGENT CARE | Age: 29
End: 2019-03-27
Payer: OTHER MISCELLANEOUS

## 2019-03-27 ENCOUNTER — APPOINTMENT (OUTPATIENT)
Dept: RADIOLOGY | Age: 29
End: 2019-03-27
Attending: PREVENTIVE MEDICINE
Payer: OTHER MISCELLANEOUS

## 2019-03-27 DIAGNOSIS — T14.90XA INJURY: ICD-10-CM

## 2019-03-27 DIAGNOSIS — T14.90XA INJURY: Primary | ICD-10-CM

## 2019-03-27 PROCEDURE — 99213 OFFICE O/P EST LOW 20 MIN: CPT | Performed by: PREVENTIVE MEDICINE

## 2019-03-27 PROCEDURE — 73030 X-RAY EXAM OF SHOULDER: CPT

## 2019-04-04 ENCOUNTER — APPOINTMENT (OUTPATIENT)
Dept: URGENT CARE | Facility: MEDICAL CENTER | Age: 29
End: 2019-04-04
Payer: OTHER MISCELLANEOUS

## 2019-04-04 PROCEDURE — 99213 OFFICE O/P EST LOW 20 MIN: CPT

## 2019-12-30 ENCOUNTER — OFFICE VISIT (OUTPATIENT)
Dept: URGENT CARE | Facility: CLINIC | Age: 29
End: 2019-12-30
Payer: COMMERCIAL

## 2019-12-30 VITALS
BODY MASS INDEX: 45.99 KG/M2 | OXYGEN SATURATION: 98 % | HEART RATE: 114 BPM | RESPIRATION RATE: 18 BRPM | HEIGHT: 67 IN | SYSTOLIC BLOOD PRESSURE: 110 MMHG | DIASTOLIC BLOOD PRESSURE: 76 MMHG | WEIGHT: 293 LBS | TEMPERATURE: 100.3 F

## 2019-12-30 DIAGNOSIS — J02.9 SORE THROAT: ICD-10-CM

## 2019-12-30 DIAGNOSIS — J06.9 VIRAL URI WITH COUGH: Primary | ICD-10-CM

## 2019-12-30 LAB — S PYO AG THROAT QL: NEGATIVE

## 2019-12-30 PROCEDURE — 87880 STREP A ASSAY W/OPTIC: CPT | Performed by: PHYSICIAN ASSISTANT

## 2019-12-30 PROCEDURE — 99213 OFFICE O/P EST LOW 20 MIN: CPT | Performed by: PHYSICIAN ASSISTANT

## 2019-12-30 PROCEDURE — 87070 CULTURE OTHR SPECIMN AEROBIC: CPT | Performed by: PHYSICIAN ASSISTANT

## 2019-12-30 RX ORDER — ALBUTEROL SULFATE 90 UG/1
2 AEROSOL, METERED RESPIRATORY (INHALATION) EVERY 4 HOURS PRN
Qty: 1 INHALER | Refills: 0
Start: 2019-12-30 | End: 2021-04-15

## 2019-12-31 NOTE — PROGRESS NOTES
[address]        NAME: Lorenza Weber is a 30 y o  female  : 1990    MRN: [id number]  DATE: 2019  TIME: 8:06 PM    Assessment and Plan   Viral URI with cough [J06 9, B97 89]  1  Viral URI with cough  POCT rapid strepA    albuterol (PROVENTIL HFA,VENTOLIN HFA) 90 mcg/act inhaler     Patient Instructions   Call in 48-72 hours for the result of your throat culture  Changes to your treatment plan will be made at this time if necessary  Begin Mucinex DM for nasal congestion  Use the inhaler as directed  Ensure that you prime the inhaler prior to first use and rinse your mouth after each use  Saltwater gargles, warm tea with honey, throat lozenges, and steam showers may help to reduce coughing fits  Use a cool mist humidifier at bedtime, turning on hours prior to bed with your bedroom doors shut for maximum relief  Follow up with your family doctor in 3-5 days if symptoms persist   Proceed to the ER if symptoms worsen  Chief Complaint     Chief Complaint   Patient presents with    Sore Throat     x 2 days - nasal congestion with PND, sore throat, dry cough, sl  STROUD, chills  No N/V or diarrhea  No Flu vaccine   Cold Like Symptoms     History of Present Illness   31 y/o female with c/o sore throat x 2-3 days  Reports feverish, tmax 100 3, chills, sweats, fatigue, NC, and a NP cough  Notes some chest tightness, intermittent wheezing, and STROUD  CT and wheezing go away with cough  Sore throat is constant, worse in the morning, and relieved with tea and drinking water  Treating with Dayquil and nyquil with little relief  With hx of asthma but does not have inhaler   with bronchitis  No other sick contacts  No recent travel  Nonsmoker  No flu shot  Review of Systems   Review of Systems   Constitutional: Positive for chills, diaphoresis, fatigue and fever  HENT: Positive for congestion, ear pain and sore throat  Negative for rhinorrhea      Respiratory: Positive for cough, chest tightness and wheezing  Negative for shortness of breath  Gastrointestinal: Negative for abdominal pain, diarrhea, nausea and vomiting  Musculoskeletal: Positive for myalgias  Skin: Negative for rash  Current Medications       Current Outpatient Medications:     albuterol (PROVENTIL HFA,VENTOLIN HFA) 90 mcg/act inhaler, Inhale 2 puffs every 4 (four) hours as needed for wheezing or shortness of breath, Disp: 1 Inhaler, Rfl: 0    IRON PO, Take 65 mg by mouth daily, Disp: , Rfl:     Current Allergies     Allergies as of 12/30/2019 - Reviewed 12/30/2019   Allergen Reaction Noted    Oxycodone Itching             The following portions of the patient's history were reviewed and updated as appropriate: allergies, current medications, past family history, past medical history, past social history, past surgical history and problem list      Past Medical History:   Diagnosis Date    Allergic rhinitis     Anemia     Arthritis     Asthma     seasonal    DUB (dysfunctional uterine bleeding)     Iliotibial band syndrome of left side     Obesity     PCOS (polycystic ovarian syndrome)     Ruptured lumbar disc        Past Surgical History:   Procedure Laterality Date    NO PAST SURGERIES      [place] HYSTEROSCOPY,W/ENDO BX N/A 1/15/2018    Procedure: DILATATION AND CURETTAGE (D&C) WITH HYSTEROSCOPY;  Surgeon: Lucho Martel MD;  [facility];  Service: Gynecology       Family History   Problem Relation Age of Onset    Diabetes Mother     Skin cancer Mother     Diabetes Father          Medications have been verified  Objective   /76   Pulse (!) 114   Temp 100 3 °F (37 9 °C)   Resp 18   Ht 5' 7" (1 702 m)   Wt (!) 249 kg (550 lb)   LMP 07/24/2019 Comment: irregular menses  SpO2 98%   BMI 86 14 kg/m²      Physical Exam     Physical Exam   Constitutional: Vital signs are normal  She appears well-developed and well-nourished  She is cooperative  She appears ill   No distress  HENT:   Head: Normocephalic and atraumatic  Right Ear: Hearing, tympanic membrane, external ear and ear canal normal    Left Ear: Hearing, tympanic membrane, external ear and ear canal normal    Nose: Rhinorrhea present  Mouth/Throat: Uvula is midline, oropharynx is clear and moist and mucous membranes are normal  No oral lesions  No trismus in the jaw  No uvula swelling  Eyes: Conjunctivae and lids are normal  Right eye exhibits no chemosis, no discharge and no exudate  Left eye exhibits no chemosis, no discharge and no exudate  Right conjunctiva is not injected  Left conjunctiva is not injected  Cardiovascular: Normal rate, regular rhythm and normal heart sounds  Exam reveals no gallop, no distant heart sounds and no friction rub  No murmur heard  Pulmonary/Chest: Effort normal and breath sounds normal  No stridor  No respiratory distress  She has no wheezes  She has no rhonchi  She has no rales  Coarse BS, diminished in bases  Lymphadenopathy:     She has no cervical adenopathy  Neurological: She is alert  She is not disoriented  No cranial nerve deficit  Coordination and gait normal    Skin: Skin is warm, dry and intact  No rash noted  She is not diaphoretic  No erythema  No pallor  Psychiatric: She has a normal mood and affect  Her behavior is normal  Judgment and thought content normal    Nursing note and vitals reviewed

## 2019-12-31 NOTE — PATIENT INSTRUCTIONS
Call in 48-72 hours for the result of your throat culture  Changes to your treatment plan will be made at this time if necessary  Begin Mucinex DM for nasal congestion  Use the inhaler as directed  Ensure that you prime the inhaler prior to first use and rinse your mouth after each use  Saltwater gargles, warm tea with honey, throat lozenges, and steam showers may help to reduce coughing fits  Use a cool mist humidifier at bedtime, turning on hours prior to bed with your bedroom doors shut for maximum relief  Follow up with your family doctor in 3-5 days if symptoms persist   Proceed to the ER if symptoms worsen

## 2020-01-01 LAB — BACTERIA THROAT CULT: NORMAL

## 2020-02-14 ENCOUNTER — HOSPITAL ENCOUNTER (EMERGENCY)
Facility: HOSPITAL | Age: 30
Discharge: HOME | End: 2020-02-14
Attending: EMERGENCY MEDICINE

## 2020-02-14 VITALS
WEIGHT: 293 LBS | HEIGHT: 67 IN | OXYGEN SATURATION: 100 % | TEMPERATURE: 97.6 F | RESPIRATION RATE: 20 BRPM | HEART RATE: 108 BPM | BODY MASS INDEX: 45.99 KG/M2 | DIASTOLIC BLOOD PRESSURE: 107 MMHG | SYSTOLIC BLOOD PRESSURE: 159 MMHG

## 2020-02-14 DIAGNOSIS — L02.416 CELLULITIS AND ABSCESS OF LEFT LEG: Primary | ICD-10-CM

## 2020-02-14 DIAGNOSIS — L03.116 CELLULITIS AND ABSCESS OF LEFT LEG: Primary | ICD-10-CM

## 2020-02-14 DIAGNOSIS — L03.116 LEFT LEG CELLULITIS: ICD-10-CM

## 2020-02-14 PROCEDURE — 10060 I&D ABSCESS SIMPLE/SINGLE: CPT | Performed by: EMERGENCY MEDICINE

## 2020-02-14 PROCEDURE — 99282 EMERGENCY DEPT VISIT SF MDM: CPT

## 2020-02-14 PROCEDURE — 99283 EMERGENCY DEPT VISIT LOW MDM: CPT | Performed by: EMERGENCY MEDICINE

## 2020-02-14 RX ORDER — CEPHALEXIN 500 MG/1
500 CAPSULE ORAL 3 TIMES DAILY
Qty: 30 CAPSULE | Refills: 0 | Status: SHIPPED | OUTPATIENT
Start: 2020-02-14 | End: 2020-02-24

## 2020-02-14 RX ORDER — CEPHALEXIN 250 MG/1
500 CAPSULE ORAL ONCE
Status: COMPLETED | OUTPATIENT
Start: 2020-02-14 | End: 2020-02-14

## 2020-02-14 RX ADMIN — CEPHALEXIN 500 MG: 250 CAPSULE ORAL at 15:09

## 2020-02-14 NOTE — ED PROVIDER NOTES
History  Chief Complaint   Patient presents with    Leg Swelling     pt states is having pain and swelling to back of L leg       History provided by:  Patient  Leg Pain   Location:  Leg  Time since incident:  4 days  Injury: yes    Mechanism of injury comment:  Patient was scratched by her dogs clot about 7 days ago pain started about 4 days ago  Leg location:  L lower leg  Pain details:     Quality:  Aching    Radiates to:  Does not radiate    Severity:  Moderate    Onset quality:  Gradual    Duration:  4 days    Timing:  Constant    Progression:  Unchanged  Chronicity:  New  Relieved by:  None tried  Worsened by:  Nothing  Ineffective treatments:  None tried  Associated symptoms: no decreased ROM, no fever and no neck pain        Prior to Admission Medications   Prescriptions Last Dose Informant Patient Reported? Taking? IRON PO   Yes No   Sig: Take 65 mg by mouth daily   albuterol (PROVENTIL HFA,VENTOLIN HFA) 90 mcg/act inhaler   No No   Sig: Inhale 2 puffs every 4 (four) hours as needed for wheezing or shortness of breath      Facility-Administered Medications: None       Past Medical History:   Diagnosis Date    Allergic rhinitis     Anemia     Arthritis     Asthma     seasonal    DUB (dysfunctional uterine bleeding)     Iliotibial band syndrome of left side     Obesity     PCOS (polycystic ovarian syndrome)     Ruptured lumbar disc        Past Surgical History:   Procedure Laterality Date    NO PAST SURGERIES      [place] HYSTEROSCOPY,W/ENDO BX N/A 1/15/2018    Procedure: DILATATION AND CURETTAGE (D&C) WITH HYSTEROSCOPY;  Surgeon: Lucho Martel MD;  [facility];  Service: Gynecology       Family History   Problem Relation Age of Onset    Diabetes Mother     Skin cancer Mother     Diabetes Father      I have reviewed and agree with the history as documented      Social History     Tobacco Use    Smoking status: Never Smoker    Smokeless tobacco: Never Used   Substance Use Topics    Alcohol use: Yes     Comment: standard drink 2 x month    Drug use: No       Review of Systems   Constitutional: Negative for activity change, chills, diaphoresis and fever  HENT: Negative for congestion, sinus pressure and sore throat  Eyes: Negative for pain and visual disturbance  Respiratory: Negative for cough, chest tightness, shortness of breath, wheezing and stridor  Cardiovascular: Negative for chest pain and palpitations  Gastrointestinal: Negative for abdominal distention, abdominal pain, constipation, diarrhea, nausea and vomiting  Genitourinary: Negative for dysuria and frequency  Musculoskeletal: Negative for neck pain and neck stiffness  Skin: Negative for rash  Neurological: Negative for dizziness, speech difficulty, light-headedness, numbness and headaches  Physical Exam  Physical Exam   Constitutional: She is oriented to person, place, and time  She appears well-developed  No distress  HENT:   Head: Normocephalic and atraumatic  Eyes: Pupils are equal, round, and reactive to light  Neck: Normal range of motion  Neck supple  No tracheal deviation present  Cardiovascular: Normal rate, regular rhythm, normal heart sounds and intact distal pulses  No murmur heard  Pulmonary/Chest: Effort normal and breath sounds normal  No stridor  No respiratory distress  Abdominal: Soft  She exhibits no distension  There is no tenderness  There is no rebound and no guarding  Musculoskeletal: Normal range of motion  Neurological: She is alert and oriented to person, place, and time  Skin: Skin is warm and dry  She is not diaphoretic  Erythema:  Erythema over left lower leg, 10 cm x 14 cm area outlined, large pustule in the center which was incision and drained  No pallor  Psychiatric: She has a normal mood and affect  Vitals reviewed        Vital Signs  ED Triage Vitals   Temperature Pulse Respirations Blood Pressure SpO2   02/14/20 1437 02/14/20 1437 02/14/20 1437 02/14/20 1441 02/14/20 1437   97 6 °F (36 4 °C) (!) 108 20 (!) 159/107 100 %      Temp Source Heart Rate Source Patient Position - Orthostatic VS BP Location FiO2 (%)   02/14/20 1437 -- 02/14/20 1441 02/14/20 1441 --   Oral  Sitting Right arm       Pain Score       02/14/20 1437       8           Vitals:    02/14/20 1437 02/14/20 1441   BP:  (!) 159/107   Pulse: (!) 108    Patient Position - Orthostatic VS:  Sitting         Visual Acuity      ED Medications  Medications   cephalexin (KEFLEX) capsule 500 mg (has no administration in time range)       Diagnostic Studies  Results Reviewed     None                 No orders to display              Procedures  Incision and drain  Date/Time: 2/14/2020 2:54 PM  Performed by: Winsome Mathis DO  Authorized by: Winsome Mathis DO     Patient location:  ED  Consent:     Consent obtained:  Verbal    Consent given by:  Patient    Risks discussed:  Bleeding and incomplete drainage  Universal protocol:     Patient identity confirmed:  Verbally with patient  Location:     Type:  Abscess    Size:  1cm    Location: Large pustule back of left leg surrounding cellulitis  Pre-procedure details:     Skin preparation:  Chloraprep  Anesthesia (see MAR for exact dosages): Anesthesia method:  None (Large pustule was unroofed)  Procedure details:     Complexity:  Simple    Scalpel blade:  11    Approach:  Open    Drainage:  Purulent    Drainage amount: Moderate    Wound treatment:  Wound left open             ED Course                               MDM  Number of Diagnoses or Management Options  Cellulitis and abscess of left leg: new and requires workup  Left leg cellulitis: new and requires workup  Diagnosis management comments: Patient with a large pustule/abscess over her left lower leg with surrounding cellulitis, area was incised with a 11 blade patient tolerated procedure very well, placed on Keflex    Strict return parameters       Amount and/or Complexity of Data Reviewed  Review and summarize past medical records: yes          Disposition  Final diagnoses:   Left leg cellulitis   Cellulitis and abscess of left leg     Time reflects when diagnosis was documented in both MDM as applicable and the Disposition within this note     Time User Action Codes Description Comment    2/14/2020  2:49 PM Sherleen Decant Add [L03 116] Left leg cellulitis     2/14/2020  2:55 PM Sherleen Decant Add [M59 501,  L02 416] Cellulitis and abscess of left leg     2/14/2020  2:55 PM Sherleen Decant Modify [L00 960] Left leg cellulitis     2/14/2020  2:55 PM Sherleen Decant Modify [J87 221,  L02 416] Cellulitis and abscess of left leg       ED Disposition     ED Disposition Condition Date/Time Comment    Discharge Stable Fri Feb 14, 2020  2:49 PM Eather Pantera discharge to home/self care  Follow-up Information     Follow up With Specialties Details Why Contact Info Additional [address] Emergency Department Emergency Medicine Go to  If symptoms worsen  Reddness goes past the line drawn on your leg or if you develop fevers [address] [facility] [address],  [address], [place], [place], [address]          Patient's Medications   Discharge Prescriptions    CEPHALEXIN (KEFLEX) 500 MG CAPSULE    Take 1 capsule (500 mg total) by mouth 3 (three) times a day for 10 days       Start Date: 2/14/2020 End Date: 2/24/2020       Order Dose: 500 mg       Quantity: 30 capsule    Refills: 0     No discharge procedures on file      PDMP Review     None          ED Provider  Electronically Signed by           Thony Gottlieb DO  02/14/20 0581

## 2020-07-07 ENCOUNTER — HOSPITAL ENCOUNTER (EMERGENCY)
Facility: HOSPITAL | Age: 30
Discharge: HOME | End: 2020-07-07
Attending: EMERGENCY MEDICINE

## 2020-07-07 VITALS
DIASTOLIC BLOOD PRESSURE: 83 MMHG | OXYGEN SATURATION: 97 % | WEIGHT: 293 LBS | HEART RATE: 109 BPM | BODY MASS INDEX: 86.14 KG/M2 | SYSTOLIC BLOOD PRESSURE: 142 MMHG | RESPIRATION RATE: 18 BRPM | TEMPERATURE: 99 F

## 2020-07-07 DIAGNOSIS — L03.116 CELLULITIS OF LEFT LOWER EXTREMITY: Primary | ICD-10-CM

## 2020-07-07 PROCEDURE — 99283 EMERGENCY DEPT VISIT LOW MDM: CPT

## 2020-07-07 PROCEDURE — 99284 EMERGENCY DEPT VISIT MOD MDM: CPT | Performed by: EMERGENCY MEDICINE

## 2020-07-07 RX ORDER — SULFAMETHOXAZOLE AND TRIMETHOPRIM 800; 160 MG/1; MG/1
1 TABLET ORAL ONCE
Status: COMPLETED | OUTPATIENT
Start: 2020-07-07 | End: 2020-07-07

## 2020-07-07 RX ORDER — SULFAMETHOXAZOLE AND TRIMETHOPRIM 800; 160 MG/1; MG/1
1 TABLET ORAL 2 TIMES DAILY
Qty: 14 TABLET | Refills: 0 | Status: SHIPPED | OUTPATIENT
Start: 2020-07-07 | End: 2020-07-14

## 2020-07-07 RX ORDER — CEPHALEXIN 500 MG/1
500 CAPSULE ORAL ONCE
Status: COMPLETED | OUTPATIENT
Start: 2020-07-07 | End: 2020-07-07

## 2020-07-07 RX ORDER — CEPHALEXIN 500 MG/1
500 CAPSULE ORAL EVERY 6 HOURS SCHEDULED
Qty: 28 CAPSULE | Refills: 0 | Status: SHIPPED | OUTPATIENT
Start: 2020-07-07 | End: 2020-07-14

## 2020-07-07 RX ADMIN — SULFAMETHOXAZOLE AND TRIMETHOPRIM 1 TABLET: 800; 160 TABLET ORAL at 20:23

## 2020-07-07 RX ADMIN — CEPHALEXIN 500 MG: 500 CAPSULE ORAL at 20:23

## 2020-07-08 NOTE — ED PROVIDER NOTES
History  Chief Complaint   Patient presents with    Leg Swelling     Pt c/o chronic LLE cellulitis treated previously in [facility]  Redness and swelling increased since yesterday  Denies fever chills N/V or diarrhea  73-year-old female presents with new redness on the posterior aspect of her left calf  States she has had this cellulitis in the past denies any nausea vomiting fevers chills or other systemic symptoms  States she has been treated with Keflex in the past with slightly improvement of the area  No other complaints      History provided by:  Patient   used: No        Prior to Admission Medications   Prescriptions Last Dose Informant Patient Reported? Taking? IRON PO Not Taking at Unknown time  Yes No   Sig: Take 65 mg by mouth daily   albuterol (PROVENTIL HFA,VENTOLIN HFA) 90 mcg/act inhaler More than a month at Unknown time  No No   Sig: Inhale 2 puffs every 4 (four) hours as needed for wheezing or shortness of breath      Facility-Administered Medications: None       Past Medical History:   Diagnosis Date    Allergic rhinitis     Anemia     Arthritis     Asthma     seasonal    DUB (dysfunctional uterine bleeding)     Iliotibial band syndrome of left side     Obesity     PCOS (polycystic ovarian syndrome)     Ruptured lumbar disc        Past Surgical History:   Procedure Laterality Date    NO PAST SURGERIES      CO HYSTEROSCOPY,W/ENDO BX N/A 1/15/2018    Procedure: DILATATION AND CURETTAGE (D&C) WITH HYSTEROSCOPY;  Surgeon: Rigo Tavares MD;  [facility];  Service: Gynecology       Family History   Problem Relation Age of Onset    Diabetes Mother     Skin cancer Mother     Diabetes Father      I have reviewed and agree with the history as documented      E-Cigarette/Vaping    E-Cigarette Use Never User      E-Cigarette/Vaping Substances     Social History     Tobacco Use    Smoking status: Never Smoker    Smokeless tobacco: Never Used Substance Use Topics    Alcohol use: Yes     Frequency: Monthly or less     Drinks per session: 1 or 2     Binge frequency: Less than monthly     Comment: standard drink 2 x month    Drug use: No       Review of Systems   Constitutional: Negative for activity change, chills, diaphoresis and fever  HENT: Negative for congestion, ear pain, nosebleeds, sore throat, trouble swallowing and voice change  Eyes: Negative for pain, discharge and redness  Respiratory: Negative for apnea, cough, choking, shortness of breath, wheezing and stridor  Cardiovascular: Negative for chest pain and palpitations  Gastrointestinal: Negative for abdominal distention, abdominal pain, constipation, diarrhea, nausea and vomiting  Endocrine: Negative for polydipsia  Genitourinary: Negative for difficulty urinating, dysuria, flank pain, frequency, hematuria and urgency  Musculoskeletal: Negative for back pain, gait problem, joint swelling, myalgias, neck pain and neck stiffness  Skin: Negative for pallor and rash  Neurological: Negative for dizziness, tremors, syncope, speech difficulty, weakness, numbness and headaches  Hematological: Negative for adenopathy  Psychiatric/Behavioral: Negative for confusion, hallucinations, self-injury and suicidal ideas  The patient is not nervous/anxious  Physical Exam  Physical Exam   Constitutional: She is oriented to person, place, and time  Vital signs are normal  She appears well-developed and well-nourished  No distress  HENT:   Head: Normocephalic and atraumatic  Right Ear: External ear normal    Left Ear: External ear normal    Nose: Nose normal    Mouth/Throat: Oropharynx is clear and moist    Eyes: Pupils are equal, round, and reactive to light  Conjunctivae are normal    Neck: Normal range of motion  Neck supple  Cardiovascular: Normal rate, regular rhythm, normal heart sounds and intact distal pulses     Pulmonary/Chest: Effort normal and breath sounds normal    Abdominal: Soft  Bowel sounds are normal    Musculoskeletal: Normal range of motion  She exhibits edema  Erythema 2 posterior calf of left leg   Neurological: She is alert and oriented to person, place, and time  She has normal reflexes  Skin: Skin is warm and dry  She is not diaphoretic  Psychiatric: She has a normal mood and affect  Nursing note and vitals reviewed  Vital Signs  ED Triage Vitals [07/07/20 1932]   Temperature Pulse Respirations Blood Pressure SpO2   99 2 °F (37 3 °C) (!) 112 22 167/86 99 %      Temp Source Heart Rate Source Patient Position - Orthostatic VS BP Location FiO2 (%)   Tympanic Monitor Sitting Right arm --      Pain Score       9           Vitals:    07/07/20 1932   BP: 167/86   Pulse: (!) 112   Patient Position - Orthostatic VS: Sitting         Visual Acuity      ED Medications  Medications   cephalexin (KEFLEX) capsule 500 mg (has no administration in time range)   sulfamethoxazole-trimethoprim (BACTRIM DS) 800-160 mg per tablet 1 tablet (has no administration in time range)       Diagnostic Studies  Results Reviewed     None                 No orders to display              Procedures  Procedures         ED Course                                             MDM      Disposition  Final diagnoses:   Cellulitis of left lower extremity     Time reflects when diagnosis was documented in both MDM as applicable and the Disposition within this note     Time User Action Codes Description Comment    7/7/2020  8:13 PM Bradley Sic Add [L03 116] Cellulitis of left lower extremity       ED Disposition     ED Disposition Condition Date/Time Comment    Discharge Stable Tue Jul 7, 2020  8:13 PM Madison Ortiz discharge to home/self care              Follow-up Information     Follow up With Specialties Details Why Contact Info Additional Information    [facility] Emergency Department Emergency Medicine  As needed [address] [address]  [phone] [facility], [place], [place], [address]          Patient's Medications   Discharge Prescriptions    CEPHALEXIN (KEFLEX) 500 MG CAPSULE    Take 1 capsule (500 mg total) by mouth every 6 (six) hours for 7 days       Start Date: 7/7/2020  End Date: 7/14/2020       Order Dose: 500 mg       Quantity: 28 capsule    Refills: 0    SULFAMETHOXAZOLE-TRIMETHOPRIM (BACTRIM DS) 800-160 MG PER TABLET    Take 1 tablet by mouth 2 (two) times a day for 7 days smx-tmp DS (BACTRIM) 800-160 mg tabs (1tab q12 D10)       Start Date: 7/7/2020  End Date: 7/14/2020       Order Dose: 1 tablet       Quantity: 14 tablet    Refills: 0     No discharge procedures on file      PDMP Review     None          ED Provider  Electronically Signed by           Courtney Barclay DO  07/07/20 2021

## 2020-08-07 ENCOUNTER — HOSPITAL ENCOUNTER (EMERGENCY)
Facility: HOSPITAL | Age: 30
Discharge: HOME | End: 2020-08-07
Attending: EMERGENCY MEDICINE

## 2020-08-07 VITALS
HEART RATE: 110 BPM | BODY MASS INDEX: 86.14 KG/M2 | DIASTOLIC BLOOD PRESSURE: 103 MMHG | TEMPERATURE: 99.6 F | SYSTOLIC BLOOD PRESSURE: 175 MMHG | WEIGHT: 293 LBS | OXYGEN SATURATION: 99 % | RESPIRATION RATE: 20 BRPM

## 2020-08-07 DIAGNOSIS — K08.89 PAIN, DENTAL: Primary | ICD-10-CM

## 2020-08-07 PROCEDURE — 99282 EMERGENCY DEPT VISIT SF MDM: CPT

## 2020-08-07 PROCEDURE — 99284 EMERGENCY DEPT VISIT MOD MDM: CPT | Performed by: PHYSICIAN ASSISTANT

## 2020-08-07 RX ORDER — NAPROXEN 500 MG/1
500 TABLET ORAL 2 TIMES DAILY WITH MEALS
Qty: 14 TABLET | Refills: 0 | Status: SHIPPED | OUTPATIENT
Start: 2020-08-07 | End: 2021-04-02

## 2020-08-07 RX ORDER — AMOXICILLIN AND CLAVULANATE POTASSIUM 875; 125 MG/1; MG/1
1 TABLET, FILM COATED ORAL EVERY 12 HOURS SCHEDULED
Qty: 20 TABLET | Refills: 0 | Status: SHIPPED | OUTPATIENT
Start: 2020-08-07 | End: 2020-08-17

## 2020-08-07 NOTE — ED PROVIDER NOTES
History  Chief Complaint   Patient presents with    Dental Pain     Pt reports left sided mouth pain for over a week  30 year female presenting today with left-sided facial pain/ dental pain over the past week  States that she consistently has pain just above the right upper dental region that radiates to the left ear  States that this is very sensitive to cold and sweets  Has not been to the dentist in few years due to her insurance  Has not noticed any facial swelling  Has been eating soft foods  States that the pain will come and go  Not overly sensitive to touch  Has not been recently sick with any sinus congestion  Mild headache noted  Denies fevers, nausea, vomiting, neck pain or stiffness  Prior to Admission Medications   Prescriptions Last Dose Informant Patient Reported? Taking? IRON PO Not Taking at Unknown time  Yes No   Sig: Take 65 mg by mouth daily   albuterol (PROVENTIL HFA,VENTOLIN HFA) 90 mcg/act inhaler   No No   Sig: Inhale 2 puffs every 4 (four) hours as needed for wheezing or shortness of breath      Facility-Administered Medications: None       Past Medical History:   Diagnosis Date    Allergic rhinitis     Anemia     Arthritis     Asthma     seasonal    DUB (dysfunctional uterine bleeding)     Iliotibial band syndrome of left side     Obesity     PCOS (polycystic ovarian syndrome)     Ruptured lumbar disc        Past Surgical History:   Procedure Laterality Date    NO PAST SURGERIES      NE HYSTEROSCOPY,W/ENDO BX N/A 1/15/2018    Procedure: DILATATION AND CURETTAGE (D&C) WITH HYSTEROSCOPY;  Surgeon: Rigo Tavares MD;  [facility];  Service: Gynecology       Family History   Problem Relation Age of Onset    Diabetes Mother     Skin cancer Mother     Diabetes Father      I have reviewed and agree with the history as documented      E-Cigarette/Vaping    E-Cigarette Use Never User      E-Cigarette/Vaping Substances     Social History Tobacco Use    Smoking status: Never Smoker    Smokeless tobacco: Never Used   Substance Use Topics    Alcohol use: Yes     Frequency: 2-4 times a month     Drinks per session: 1 or 2     Binge frequency: Never     Comment: standard drink 2 x month    Drug use: No       Review of Systems   Constitutional: Negative  Negative for appetite change, chills and fever  HENT: Positive for dental problem  Negative for congestion, drooling, ear discharge, ear pain, facial swelling, hearing loss, mouth sores, nosebleeds, postnasal drip, rhinorrhea, sinus pressure, sinus pain, sneezing, sore throat, tinnitus, trouble swallowing and voice change  Eyes: Negative  Respiratory: Negative  Negative for cough, chest tightness, shortness of breath and wheezing  Cardiovascular: Negative  Gastrointestinal: Negative  Negative for constipation, diarrhea, nausea and vomiting  Genitourinary: Negative  Musculoskeletal: Negative  Negative for neck pain and neck stiffness  Skin: Negative  Negative for color change and wound  Neurological: Negative for dizziness, weakness, numbness and headaches  All other systems reviewed and are negative  Physical Exam  Physical Exam  Vitals signs and nursing note reviewed  Constitutional:       Appearance: Normal appearance  She is obese  HENT:      Head: Normocephalic and atraumatic  Right Ear: Tympanic membrane, ear canal and external ear normal       Left Ear: Tympanic membrane, ear canal and external ear normal       Nose: Nose normal       Mouth/Throat:     Eyes:      Conjunctiva/sclera: Conjunctivae normal    Cardiovascular:      Rate and Rhythm: Normal rate  Pulses: Normal pulses  Pulmonary:      Effort: Pulmonary effort is normal       Comments: S PO2 is 99% indicating adequate oxygenation  Skin:     General: Skin is warm and dry  Capillary Refill: Capillary refill takes less than 2 seconds     Neurological:      Mental Status: She is alert          Vital Signs  ED Triage Vitals [08/07/20 1757]   Temperature Pulse Respirations Blood Pressure SpO2   99 6 °F (37 6 °C) (!) 110 20 (!) 175/103 99 %      Temp Source Heart Rate Source Patient Position - Orthostatic VS BP Location FiO2 (%)   Tympanic Monitor Sitting Right arm --      Pain Score       7           Vitals:    08/07/20 1757   BP: (!) 175/103   Pulse: (!) 110   Patient Position - Orthostatic VS: Sitting         Visual Acuity      ED Medications  Medications - No data to display    Diagnostic Studies  Results Reviewed     None                 No orders to display              Procedures  Procedures         ED Course       US AUDIT      Most Recent Value   Initial Alcohol Screen: US AUDIT-C    1  How often do you have a drink containing alcohol? 3 Filed at: 08/07/2020 1758   2  How many drinks containing alcohol do you have on a typical day you are drinking? 1 Filed at: 08/07/2020 1758   3b  FEMALE Any Age, or MALE 65+: How often do you have 4 or more drinks on one occassion? 0 Filed at: 08/07/2020 1758   Audit-C Score  4 Filed at: 08/07/2020 1758                  MARIO/DAST-10      Most Recent Value   How many times in the past year have you    Used an illegal drug or used a prescription medication for non-medical reasons? Never Filed at: 08/07/2020 1758                                MDM  Number of Diagnoses or Management Options  Pain, dental:   Diagnosis management comments: Will treat for dental pain  Given education to avoid sweets and cold foods  Patient is informed to return to the emergency department for worsening of symptoms and was given proper education regarding their diagnosis and symptoms  Otherwise the patient is informed to follow up with their primary care doctor or dentist for re-evaluation  The patient verbalizes understanding and agrees with above assessment and plan  All questions were answered       Please Note: Fluency Direct voice recognition software may have been used in the creation of this document  Wrong words or sound a like substitutions may have occurred due to the inherent limitations of the voice software  Amount and/or Complexity of Data Reviewed  Review and summarize past medical records: yes  Independent visualization of images, tracings, or specimens: yes          Disposition  Final diagnoses:   Pain, dental     Time reflects when diagnosis was documented in both MDM as applicable and the Disposition within this note     Time User Action Codes Description Comment    8/7/2020  6:14 PM Clint Hawkins Add [K08 89] Pain, dental       ED Disposition     ED Disposition Condition Date/Time Comment    Discharge Stable Fri Aug 7, 2020  6:14 PM Tashi Gama discharge to home/self care  Follow-up Information     Follow up With Specialties Details Why Contact Info Additional Information    [facility] Emergency Department Emergency Medicine Go to  If symptoms worsen, otherwise please follow up with your family doctor [address] [place] [address] [address] [facility], [place], [place], [address]          Patient's Medications   Discharge Prescriptions    AMOXICILLIN-CLAVULANATE (AUGMENTIN) 875-125 MG PER TABLET    Take 1 tablet by mouth every 12 (twelve) hours for 10 days       Start Date: 8/7/2020  End Date: 8/17/2020       Order Dose: 1 tablet       Quantity: 20 tablet    Refills: 0    NAPROXEN (NAPROSYN) 500 MG TABLET    Take 1 tablet (500 mg total) by mouth 2 (two) times a day with meals for 7 days       Start Date: 8/7/2020  End Date: 8/14/2020       Order Dose: 500 mg       Quantity: 14 tablet    Refills: 0     No discharge procedures on file      PDMP Review     None          ED Provider  Electronically Signed by           Rosalind Mathis PA-C  08/07/20 8172

## 2020-10-08 ENCOUNTER — AMB VIDEO VISIT (OUTPATIENT)
Dept: OTHER | Facility: HOSPITAL | Age: 30
End: 2020-10-08

## 2021-01-13 ENCOUNTER — TELEPHONE (OUTPATIENT)
Dept: OBGYN CLINIC | Facility: CLINIC | Age: 31
End: 2021-01-13

## 2021-04-02 ENCOUNTER — APPOINTMENT (EMERGENCY)
Dept: RADIOLOGY | Facility: HOSPITAL | Age: 31
DRG: 720 | End: 2021-04-02
Payer: COMMERCIAL

## 2021-04-02 ENCOUNTER — HOSPITAL ENCOUNTER (INPATIENT)
Facility: HOSPITAL | Age: 31
LOS: 1 days | Discharge: HOME | DRG: 720 | End: 2021-04-04
Attending: INTERNAL MEDICINE | Admitting: INTERNAL MEDICINE
Payer: COMMERCIAL

## 2021-04-02 DIAGNOSIS — I89.0 LYMPHEDEMA: ICD-10-CM

## 2021-04-02 DIAGNOSIS — A41.9 SEVERE SEPSIS (HCC): Primary | ICD-10-CM

## 2021-04-02 DIAGNOSIS — R65.20 SEVERE SEPSIS (HCC): Primary | ICD-10-CM

## 2021-04-02 DIAGNOSIS — L03.116 CELLULITIS OF LEFT LOWER EXTREMITY: ICD-10-CM

## 2021-04-02 DIAGNOSIS — L03.90 CELLULITIS: ICD-10-CM

## 2021-04-02 LAB
ALBUMIN SERPL BCP-MCNC: 2.5 G/DL (ref 3.5–5)
ALP SERPL-CCNC: 103 U/L (ref 46–116)
ALT SERPL W P-5'-P-CCNC: 25 U/L (ref 12–78)
ANION GAP SERPL CALCULATED.3IONS-SCNC: 9 MMOL/L (ref 4–13)
APTT PPP: 33 SECONDS (ref 23–37)
AST SERPL W P-5'-P-CCNC: 18 U/L (ref 5–45)
BASOPHILS # BLD AUTO: 0.03 THOUSANDS/ΜL (ref 0–0.1)
BASOPHILS NFR BLD AUTO: 0 % (ref 0–1)
BILIRUB SERPL-MCNC: 0.21 MG/DL (ref 0.2–1)
BUN SERPL-MCNC: 8 MG/DL (ref 5–25)
CALCIUM ALBUM COR SERPL-MCNC: 10.3 MG/DL (ref 8.3–10.1)
CALCIUM SERPL-MCNC: 9.1 MG/DL (ref 8.3–10.1)
CHLORIDE SERPL-SCNC: 104 MMOL/L (ref 100–108)
CO2 SERPL-SCNC: 28 MMOL/L (ref 21–32)
CREAT SERPL-MCNC: 0.74 MG/DL (ref 0.6–1.3)
EOSINOPHIL # BLD AUTO: 0.11 THOUSAND/ΜL (ref 0–0.61)
EOSINOPHIL NFR BLD AUTO: 1 % (ref 0–6)
ERYTHROCYTE [DISTWIDTH] IN BLOOD BY AUTOMATED COUNT: 16.5 % (ref 11.6–15.1)
GFR SERPL CREATININE-BSD FRML MDRD: 110 ML/MIN/1.73SQ M
GLUCOSE SERPL-MCNC: 216 MG/DL (ref 65–140)
HCT VFR BLD AUTO: 34.5 % (ref 34.8–46.1)
HGB BLD-MCNC: 10 G/DL (ref 11.5–15.4)
IMM GRANULOCYTES # BLD AUTO: 0.26 THOUSAND/UL (ref 0–0.2)
IMM GRANULOCYTES NFR BLD AUTO: 2 % (ref 0–2)
INR PPP: 0.98 (ref 0.84–1.19)
LACTATE SERPL-SCNC: 2.6 MMOL/L (ref 0.5–2)
LYMPHOCYTES # BLD AUTO: 2.64 THOUSANDS/ΜL (ref 0.6–4.47)
LYMPHOCYTES NFR BLD AUTO: 22 % (ref 14–44)
MCH RBC QN AUTO: 22.6 PG (ref 26.8–34.3)
MCHC RBC AUTO-ENTMCNC: 29 G/DL (ref 31.4–37.4)
MCV RBC AUTO: 78 FL (ref 82–98)
MONOCYTES # BLD AUTO: 0.67 THOUSAND/ΜL (ref 0.17–1.22)
MONOCYTES NFR BLD AUTO: 6 % (ref 4–12)
NEUTROPHILS # BLD AUTO: 8.12 THOUSANDS/ΜL (ref 1.85–7.62)
NEUTS SEG NFR BLD AUTO: 69 % (ref 43–75)
NRBC BLD AUTO-RTO: 0 /100 WBCS
PLATELET # BLD AUTO: 343 THOUSANDS/UL (ref 149–390)
PMV BLD AUTO: 9.9 FL (ref 8.9–12.7)
POTASSIUM SERPL-SCNC: 3.9 MMOL/L (ref 3.5–5.3)
PROT SERPL-MCNC: 7.3 G/DL (ref 6.4–8.2)
PROTHROMBIN TIME: 13 SECONDS (ref 11.6–14.5)
RBC # BLD AUTO: 4.43 MILLION/UL (ref 3.81–5.12)
SODIUM SERPL-SCNC: 141 MMOL/L (ref 136–145)
TROPONIN I SERPL-MCNC: <0.02 NG/ML
WBC # BLD AUTO: 11.83 THOUSAND/UL (ref 4.31–10.16)

## 2021-04-02 PROCEDURE — 83550 IRON BINDING TEST: CPT | Performed by: PHYSICIAN ASSISTANT

## 2021-04-02 PROCEDURE — 80053 COMPREHEN METABOLIC PANEL: CPT | Performed by: EMERGENCY MEDICINE

## 2021-04-02 PROCEDURE — 85610 PROTHROMBIN TIME: CPT | Performed by: EMERGENCY MEDICINE

## 2021-04-02 PROCEDURE — 85730 THROMBOPLASTIN TIME PARTIAL: CPT | Performed by: EMERGENCY MEDICINE

## 2021-04-02 PROCEDURE — 84703 CHORIONIC GONADOTROPIN ASSAY: CPT | Performed by: PHYSICIAN ASSISTANT

## 2021-04-02 PROCEDURE — 83036 HEMOGLOBIN GLYCOSYLATED A1C: CPT | Performed by: PHYSICIAN ASSISTANT

## 2021-04-02 PROCEDURE — 87040 BLOOD CULTURE FOR BACTERIA: CPT | Performed by: EMERGENCY MEDICINE

## 2021-04-02 PROCEDURE — 96365 THER/PROPH/DIAG IV INF INIT: CPT

## 2021-04-02 PROCEDURE — 82728 ASSAY OF FERRITIN: CPT | Performed by: PHYSICIAN ASSISTANT

## 2021-04-02 PROCEDURE — 84145 PROCALCITONIN (PCT): CPT | Performed by: EMERGENCY MEDICINE

## 2021-04-02 PROCEDURE — 99285 EMERGENCY DEPT VISIT HI MDM: CPT | Performed by: EMERGENCY MEDICINE

## 2021-04-02 PROCEDURE — 93005 ELECTROCARDIOGRAM TRACING: CPT

## 2021-04-02 PROCEDURE — 36415 COLL VENOUS BLD VENIPUNCTURE: CPT | Performed by: EMERGENCY MEDICINE

## 2021-04-02 PROCEDURE — 84484 ASSAY OF TROPONIN QUANT: CPT | Performed by: EMERGENCY MEDICINE

## 2021-04-02 PROCEDURE — 85025 COMPLETE CBC W/AUTO DIFF WBC: CPT | Performed by: EMERGENCY MEDICINE

## 2021-04-02 PROCEDURE — 99285 EMERGENCY DEPT VISIT HI MDM: CPT

## 2021-04-02 PROCEDURE — 83540 ASSAY OF IRON: CPT | Performed by: PHYSICIAN ASSISTANT

## 2021-04-02 PROCEDURE — 71045 X-RAY EXAM CHEST 1 VIEW: CPT

## 2021-04-02 PROCEDURE — 83605 ASSAY OF LACTIC ACID: CPT | Performed by: EMERGENCY MEDICINE

## 2021-04-02 RX ADMIN — SODIUM CHLORIDE, SODIUM LACTATE, POTASSIUM CHLORIDE, AND CALCIUM CHLORIDE 1000 ML: .6; .31; .03; .02 INJECTION, SOLUTION INTRAVENOUS at 23:12

## 2021-04-03 ENCOUNTER — TELEPHONE (OUTPATIENT)
Dept: CT IMAGING | Facility: HOSPITAL | Age: 31
End: 2021-04-03

## 2021-04-03 ENCOUNTER — APPOINTMENT (INPATIENT)
Dept: CT IMAGING | Facility: HOSPITAL | Age: 31
DRG: 720 | End: 2021-04-03
Payer: COMMERCIAL

## 2021-04-03 PROBLEM — L03.90 CELLULITIS: Status: ACTIVE | Noted: 2021-04-03

## 2021-04-03 PROBLEM — R65.20 SEVERE SEPSIS (HCC): Status: ACTIVE | Noted: 2021-04-03

## 2021-04-03 PROBLEM — A41.9 SEVERE SEPSIS (HCC): Status: ACTIVE | Noted: 2021-04-03

## 2021-04-03 PROBLEM — D64.9 ANEMIA: Status: ACTIVE | Noted: 2021-04-03

## 2021-04-03 LAB
ANION GAP SERPL CALCULATED.3IONS-SCNC: 8 MMOL/L (ref 4–13)
BASOPHILS # BLD AUTO: 0.02 THOUSANDS/ΜL (ref 0–0.1)
BASOPHILS NFR BLD AUTO: 0 % (ref 0–1)
BUN SERPL-MCNC: 7 MG/DL (ref 5–25)
CALCIUM SERPL-MCNC: 8.6 MG/DL (ref 8.3–10.1)
CHLORIDE SERPL-SCNC: 104 MMOL/L (ref 100–108)
CO2 SERPL-SCNC: 26 MMOL/L (ref 21–32)
CREAT SERPL-MCNC: 0.63 MG/DL (ref 0.6–1.3)
EOSINOPHIL # BLD AUTO: 0.08 THOUSAND/ΜL (ref 0–0.61)
EOSINOPHIL NFR BLD AUTO: 1 % (ref 0–6)
ERYTHROCYTE [DISTWIDTH] IN BLOOD BY AUTOMATED COUNT: 16.8 % (ref 11.6–15.1)
EST. AVERAGE GLUCOSE BLD GHB EST-MCNC: 143 MG/DL
FERRITIN SERPL-MCNC: 46 NG/ML (ref 8–388)
GFR SERPL CREATININE-BSD FRML MDRD: 122 ML/MIN/1.73SQ M
GLUCOSE SERPL-MCNC: 114 MG/DL (ref 65–140)
GLUCOSE SERPL-MCNC: 157 MG/DL (ref 65–140)
GLUCOSE SERPL-MCNC: 163 MG/DL (ref 65–140)
HBA1C MFR BLD: 6.6 %
HCG SERPL QL: NEGATIVE
HCT VFR BLD AUTO: 35.5 % (ref 34.8–46.1)
HGB BLD-MCNC: 10.2 G/DL (ref 11.5–15.4)
IMM GRANULOCYTES # BLD AUTO: 0.2 THOUSAND/UL (ref 0–0.2)
IMM GRANULOCYTES NFR BLD AUTO: 2 % (ref 0–2)
IRON SATN MFR SERPL: 10 %
IRON SERPL-MCNC: 30 UG/DL (ref 50–170)
LACTATE SERPL-SCNC: 2 MMOL/L (ref 0.5–2)
LYMPHOCYTES # BLD AUTO: 2.19 THOUSANDS/ΜL (ref 0.6–4.47)
LYMPHOCYTES NFR BLD AUTO: 18 % (ref 14–44)
MCH RBC QN AUTO: 22.5 PG (ref 26.8–34.3)
MCHC RBC AUTO-ENTMCNC: 28.7 G/DL (ref 31.4–37.4)
MCV RBC AUTO: 78 FL (ref 82–98)
MONOCYTES # BLD AUTO: 0.54 THOUSAND/ΜL (ref 0.17–1.22)
MONOCYTES NFR BLD AUTO: 4 % (ref 4–12)
NEUTROPHILS # BLD AUTO: 9.18 THOUSANDS/ΜL (ref 1.85–7.62)
NEUTS SEG NFR BLD AUTO: 75 % (ref 43–75)
NRBC BLD AUTO-RTO: 0 /100 WBCS
PLATELET # BLD AUTO: 302 THOUSANDS/UL (ref 149–390)
PMV BLD AUTO: 9.2 FL (ref 8.9–12.7)
POTASSIUM SERPL-SCNC: 3.9 MMOL/L (ref 3.5–5.3)
PROCALCITONIN SERPL-MCNC: <0.05 NG/ML
RBC # BLD AUTO: 4.54 MILLION/UL (ref 3.81–5.12)
SODIUM SERPL-SCNC: 138 MMOL/L (ref 136–145)
TIBC SERPL-MCNC: 295 UG/DL (ref 250–450)
WBC # BLD AUTO: 12.21 THOUSAND/UL (ref 4.31–10.16)

## 2021-04-03 PROCEDURE — 82948 REAGENT STRIP/BLOOD GLUCOSE: CPT

## 2021-04-03 PROCEDURE — 73701 CT LOWER EXTREMITY W/DYE: CPT

## 2021-04-03 PROCEDURE — 36415 COLL VENOUS BLD VENIPUNCTURE: CPT | Performed by: PHYSICIAN ASSISTANT

## 2021-04-03 PROCEDURE — 99223 1ST HOSP IP/OBS HIGH 75: CPT | Performed by: INTERNAL MEDICINE

## 2021-04-03 PROCEDURE — 87081 CULTURE SCREEN ONLY: CPT | Performed by: EMERGENCY MEDICINE

## 2021-04-03 PROCEDURE — 85025 COMPLETE CBC W/AUTO DIFF WBC: CPT | Performed by: PHYSICIAN ASSISTANT

## 2021-04-03 PROCEDURE — 83605 ASSAY OF LACTIC ACID: CPT | Performed by: PHYSICIAN ASSISTANT

## 2021-04-03 PROCEDURE — 80048 BASIC METABOLIC PNL TOTAL CA: CPT | Performed by: PHYSICIAN ASSISTANT

## 2021-04-03 PROCEDURE — 3044F HG A1C LEVEL LT 7.0%: CPT | Performed by: INTERNAL MEDICINE

## 2021-04-03 PROCEDURE — 96367 TX/PROPH/DG ADDL SEQ IV INF: CPT

## 2021-04-03 RX ORDER — FUROSEMIDE 40 MG/1
40 TABLET ORAL DAILY
Status: DISCONTINUED | OUTPATIENT
Start: 2021-04-03 | End: 2021-04-04 | Stop reason: HOSPADM

## 2021-04-03 RX ORDER — SODIUM CHLORIDE 9 MG/ML
125 INJECTION, SOLUTION INTRAVENOUS CONTINUOUS
Status: DISCONTINUED | OUTPATIENT
Start: 2021-04-03 | End: 2021-04-03

## 2021-04-03 RX ORDER — ALBUTEROL SULFATE 90 UG/1
2 AEROSOL, METERED RESPIRATORY (INHALATION) EVERY 4 HOURS PRN
Status: DISCONTINUED | OUTPATIENT
Start: 2021-04-03 | End: 2021-04-04 | Stop reason: HOSPADM

## 2021-04-03 RX ORDER — CEFAZOLIN SODIUM 2 G/50ML
2000 SOLUTION INTRAVENOUS EVERY 8 HOURS
Status: DISCONTINUED | OUTPATIENT
Start: 2021-04-03 | End: 2021-04-04 | Stop reason: HOSPADM

## 2021-04-03 RX ORDER — ACETAMINOPHEN 325 MG/1
650 TABLET ORAL EVERY 6 HOURS PRN
Status: DISCONTINUED | OUTPATIENT
Start: 2021-04-03 | End: 2021-04-04 | Stop reason: HOSPADM

## 2021-04-03 RX ADMIN — CEFAZOLIN SODIUM 2000 MG: 2 SOLUTION INTRAVENOUS at 16:28

## 2021-04-03 RX ADMIN — SODIUM CHLORIDE 125 ML/HR: 0.9 INJECTION, SOLUTION INTRAVENOUS at 07:47

## 2021-04-03 RX ADMIN — IOHEXOL 100 ML: 350 INJECTION, SOLUTION INTRAVENOUS at 13:32

## 2021-04-03 RX ADMIN — ENOXAPARIN SODIUM 60 MG: 60 INJECTION SUBCUTANEOUS at 18:25

## 2021-04-03 RX ADMIN — FUROSEMIDE 40 MG: 40 TABLET ORAL at 11:18

## 2021-04-03 RX ADMIN — ENOXAPARIN SODIUM 60 MG: 60 INJECTION SUBCUTANEOUS at 08:02

## 2021-04-03 RX ADMIN — CEFTRIAXONE SODIUM 1000 MG: 10 INJECTION, POWDER, FOR SOLUTION INTRAVENOUS at 00:14

## 2021-04-03 RX ADMIN — VANCOMYCIN HYDROCHLORIDE 2000 MG: 1 INJECTION, POWDER, LYOPHILIZED, FOR SOLUTION INTRAVENOUS at 02:38

## 2021-04-03 RX ADMIN — SODIUM CHLORIDE 125 ML/HR: 0.9 INJECTION, SOLUTION INTRAVENOUS at 01:36

## 2021-04-03 RX ADMIN — CEFAZOLIN SODIUM 2000 MG: 2 SOLUTION INTRAVENOUS at 08:03

## 2021-04-03 NOTE — UTILIZATION REVIEW
Initial Clinical Review    Admission: Date/Time/Statement:   Admission Orders (From admission, onward)     Ordered        04/03/21 0045  Inpatient Admission  Once                   Orders Placed This Encounter   Procedures    Inpatient Admission     Standing Status:   Standing     Number of Occurrences:   1     Order Specific Question:   Level of Care     Answer:   Med Surg [16]     Order Specific Question:   Estimated length of stay     Answer:   More than 2 Midnights     Order Specific Question:   Certification     Answer:   I certify that inpatient services are medically necessary for this patient for a duration of greater than two midnights  See H&P and MD Progress Notes for additional information about the patient's course of treatment  ED Arrival Information     Expected Arrival Acuity Means of Arrival Escorted By Service Admission Type    - 4/2/2021 21:32 Emergent [facility]In [facility] Hospitalist Emergency    Arrival Complaint    OPEN WOUND ON LEG        Chief Complaint   Patient presents with    Wound Check     Patient reports having left posterior lower leg wound presents since December - completed x2 courses oral antibiotics (last in [facility]) w/o improvement to wound  Reports redness, warmth, increased drainage from area  (+) Sepsis criteria  Assessment/Plan:  34 yo female presents to ED from home due to left lower extremity wound and erythema  wound first noted in  December and treated with oral antibiotics including Keflex  Patient reports pain and drainage have increased over past few days   Pt presents with tachycardia, tachypnea, lactic acid 2 6, WBC elevated  Glucose 216 Hgb 10  Exam notes pt is morbidly obese, BMI 89 27 Breath sounds decreased, wound described as Circular lesion of the left lower extremity on the medial aspect just above the malleolus    No significant drainage on exam   Raise borders with induration extending approximately 2-3 inches past area of erythema  Admitted as inpatient to med surg for evaluation and treatment of cellulitis  IV antibiotics initiated, will check MRSA swab, blood cultures obtained, consider ID consult if no improvement , elevate LE, serial monitoring of wound, check hgb A 1 c  Check iron panel  Check CT LE to r/o osteomyelitis    ED Triage Vitals   Temperature Pulse Respirations Blood Pressure SpO2   04/02/21 2206 04/02/21 2206 04/02/21 2206 04/02/21 2315 04/02/21 2206   98 5 °F (36 9 °C) (S) (!) 140 (S) (!) 24 146/74 96 %      Temp Source Heart Rate Source Patient Position - Orthostatic VS BP Location FiO2 (%)   04/02/21 2206 04/02/21 2206 04/02/21 2206 04/02/21 2206 --   Oral Monitor Sitting Right arm       Pain Score       04/02/21 2206       4          Wt Readings from Last 1 Encounters:   04/02/21 (!) 259 kg (570 lb)     Additional Vital Signs:   Date/Time  Temp  Pulse  Resp  BP  MAP (mmHg)  SpO2  O2 Device  Patient Position - Orthostatic VS   04/03/21 15:38:02  98 1 °F (36 7 °C)  108Abnormal   16  126/91  103  100 %  --  --   04/03/21 08:06:08  98 °F (36 7 °C)  110Abnormal   16  147/91  110  96 %  --  --   04/03/21 0137  --  109Abnormal   22  129/60  --  98 %  None (Room air)  Lying   04/02/21 2315  --  120Abnormal   22  146/74  102  98 %  None (Room air)  Lying       Pertinent Labs/Diagnostic Test Results:    EKG 4/2   Sinus tachycardia, rate 122, normal intervals, normal axis, no acute ST, T-wave changes  No previous EKGs available  4/2 CXR  IMPRESSION:  No acute cardiopulmonary disease  4/3 CT Left tib/fib with IV contrast      No CT evidence of necrotizing infection  2    Skin and soft tissue swelling surrounding the lower extremity without organized collection      Results from last 7 days   Lab Units 04/03/21  1017 04/02/21  2240   WBC Thousand/uL 12 21* 11 83*   HEMOGLOBIN g/dL 10 2* 10 0*   HEMATOCRIT % 35 5 34 5*   PLATELETS Thousands/uL 302 343   NEUTROS ABS Thousands/µL 9 18* 8 12*         Results from last 7 days   Lab Units 04/03/21  1017 04/02/21  2240   SODIUM mmol/L 138 141   POTASSIUM mmol/L 3 9 3 9   CHLORIDE mmol/L 104 104   CO2 mmol/L 26 28   ANION GAP mmol/L 8 9   BUN mg/dL 7 8   CREATININE mg/dL 0 63 0 74   EGFR ml/min/1 73sq m 122 110   CALCIUM mg/dL 8 6 9 1     Results from last 7 days   Lab Units 04/02/21  2240   AST U/L 18   ALT U/L 25   ALK PHOS U/L 103   TOTAL PROTEIN g/dL 7 3   ALBUMIN g/dL 2 5*   TOTAL BILIRUBIN mg/dL 0 21     Results from last 7 days   Lab Units 04/03/21  1618   POC GLUCOSE mg/dl 163*     Results from last 7 days   Lab Units 04/03/21  1017 04/02/21  2240   GLUCOSE RANDOM mg/dL 157* 216*         Results from last 7 days   Lab Units 04/02/21  2240   HEMOGLOBIN A1C % 6 6*   EAG mg/dl 143     No results found for: BETA-HYDROXYBUTYRATE                   Results from last 7 days   Lab Units 04/02/21  2240   TROPONIN I ng/mL <0 02         Results from last 7 days   Lab Units 04/02/21  2240   PROTIME seconds 13 0   INR  0 98   PTT seconds 33         Results from last 7 days   Lab Units 04/02/21  2240   PROCALCITONIN ng/ml <0 05     Results from last 7 days   Lab Units 04/03/21  0136 04/02/21  2240   LACTIC ACID mmol/L 2 0 2 6*                 Results from last 7 days   Lab Units 04/02/21  2240   FERRITIN ng/mL 46                                                     Results from last 7 days   Lab Units 04/02/21  2249 04/02/21  2240   BLOOD CULTURE  Received in Microbiology Lab  Culture in Progress  Received in Microbiology Lab  Culture in Progress                 ED Treatment:   Medication Administration from 04/02/2021 2131 to 04/03/2021 6007       Date/Time Order Dose Route     04/03/2021 0013 lactated ringers bolus 1,000 mL 0 mL Intravenous     04/02/2021 2312 lactated ringers bolus 1,000 mL 1,000 mL Intravenous     04/03/2021 0438 vancomycin (VANCOCIN) 2,000 mg in sodium chloride 0 9 % 500 mL IVPB 0 mg Intravenous     04/03/2021 0238 vancomycin (VANCOCIN) 2,000 mg in sodium chloride 0 9 % 500 mL IVPB 2,000 mg Intravenous     04/03/2021 0050 ceftriaxone (ROCEPHIN) 1 g/50 mL in dextrose IVPB 0 mg Intravenous     04/03/2021 0014 ceftriaxone (ROCEPHIN) 1 g/50 mL in dextrose IVPB 1,000 mg Intravenous     04/03/2021 0136 sodium chloride 0 9 % infusion 125 mL/hr Intravenous        Past Medical History:   Diagnosis Date    Allergic rhinitis     Anemia     Arthritis     Asthma     seasonal    DUB (dysfunctional uterine bleeding)     Iliotibial band syndrome of left side     Obesity     PCOS (polycystic ovarian syndrome)     Ruptured lumbar disc      Present on Admission:   Morbid obesity ([facility] [address] )      Admitting Diagnosis: Cellulitis of left lower extremity [L03 116]  Severe sepsis ([facility] [place] [address] ) [A41 9, R65 20]  Age/Sex: 35 y o  female  Admission Orders:  Scheduled Medications:  cefazolin, 2,000 mg, Intravenous, Q8H  enoxaparin, 60 mg, Subcutaneous, BID  furosemide, 40 mg, Oral, Daily  insulin lispro, 1-5 Units, Subcutaneous, TID AC      Continuous IV Infusions:     PRN Meds:  acetaminophen, 650 mg, Oral, Q6H PRN  albuterol, 2 puff, Inhalation, Q4H PRN      POC glucose qac and HS   OOB as tole   BMP, CBC, procalcitonin 4/4    SCD's    Elevate affected limb     Network Utilization Review Department  ATTENTION: Please call with any questions or concerns to [phone] and carefully listen to the prompts so that you are directed to the right person  All voicemails are confidential   Yash Michel all requests for admission clinical reviews, approved or denied determinations and any other requests to dedicated fax number below belonging to the campus where the patient is receiving treatment   List of dedicated fax numbers for the Facilities:  [address] DENIALS (Administrative/Medical Necessity) [phone]   [address] [place] [address] (Maternity/NICU/Pediatrics) 261 [facility],[address] Floor [address] [place] [address]) [address] [place] [address] Karli Sidhu [facility] [phone]   [place] [address] [phone]

## 2021-04-03 NOTE — H&P
[facility]  H&P- Madison Ortiz 1990, 35 y o  female MRN: [id number]  Unit/Bed#: FT 02 Encounter: [id number]  Primary Care Provider: No primary care provider on file  Date and time admitted to hospital: 4/2/2021 10:18 PM    * Cellulitis  Assessment & Plan  · Patient with cellulitis of left lower extremity which has been ongoing since December  Patient has been on various oral antibiotics including Keflex which he states is not offered any relief  She states that it wax and wane but currently has been draining over the last couple of days with significant pain  Area is significantly indurated  · High-dose Keflex  · Elevate lower extremity  · Serial monitoring  · Consider ID consult if no improvement    Severe sepsis (HCC)  Assessment & Plan  · Evidenced by tachycardia, tachypnea, lower extremity cellulitis  · Lactic acid 2 6  · Started on Rocephin, vancomycin in the emergency department  · Check MRSA swab  · Will continue with high-dose Ancef for now  · Follow blood cultures  · Trend white blood cell count, and fever  · Patient is currently hemodynamically stable    Anemia  Assessment & Plan  · Hemoglobin 10 0  · MCV 79   · Check iron panel     Morbid obesity (HCC)  Assessment & Plan  · Morbidly obese with BMI 89   · Counseled on weight loss  · Check a1c given elevated sugars and likely glucose intolerance         VTE Prophylaxis: Enoxaparin (Lovenox)  / sequential compression device   Code Status: full   POLST: There is no POLST form on file for this patient (pre-hospital)  Discussion with family: patient     Anticipated Length of Stay:  Patient will be admitted on an Inpatient basis with an anticipated length of stay of  > 2 midnights  Justification for Hospital Stay: cellulitis     Total Time for Visit, including Counseling / Coordination of Care: 1 hour  Greater than 50% of this total time spent on direct patient counseling and coordination of care      Chief Complaint:   Lower extremity wound  History of Present Illness:    Radu Santos is a 35 y o  female who presents with cellulitis  Past medical history significant for seasonal asthma, morbid obesity  Presents to the emergency department for left lower extremity erythema, edema, and wound which is draining  Patient states that this initially began in December and has been ongoing since then appearing on and off  She states that she does have some drainage from the wound recently  Has been placed on multiple oral antibiotics including Keflex which he states does not help  She does have significant pain, and induration around the wound  She denies any trauma, or puncturing to that area  No fevers, chills, chest pain, shortness of breath, abdominal pain, nausea, vomiting diarrhea  Review of Systems:    Review of Systems   Constitutional: Negative for chills, fatigue, fever and unexpected weight change  Respiratory: Negative for cough, chest tightness and shortness of breath  Cardiovascular: Negative for chest pain and palpitations  Gastrointestinal: Negative for abdominal pain, diarrhea, nausea and vomiting  Genitourinary: Negative for decreased urine volume, dysuria, frequency and urgency  Musculoskeletal: Negative for arthralgias  Skin: Positive for color change, rash and wound  Neurological: Negative for dizziness, syncope, light-headedness and headaches  All other systems reviewed and are negative        Past Medical and Surgical History:     Past Medical History:   Diagnosis Date    Allergic rhinitis     Anemia     Arthritis     Asthma     seasonal    DUB (dysfunctional uterine bleeding)     Iliotibial band syndrome of left side     Obesity     PCOS (polycystic ovarian syndrome)     Ruptured lumbar disc        Past Surgical History:   Procedure Laterality Date    NO PAST SURGERIES      [place] HYSTEROSCOPY,W/ENDO BX N/A 1/15/2018    Procedure: DILATATION AND CURETTAGE (D&C) WITH HYSTEROSCOPY;  Surgeon: Dario Chaudhary MD;  Location: [facility];  Service: Gynecology       Meds/Allergies:    Prior to Admission medications    Medication Sig Start Date End Date Taking? Authorizing Provider   albuterol (PROVENTIL HFA,VENTOLIN HFA) 90 mcg/act inhaler Inhale 2 puffs every 4 (four) hours as needed for wheezing or shortness of breath 12/30/19   Marcel Fair PA-C     I have reviewed home medications with patient personally  Allergies: Allergies   Allergen Reactions    Oxycodone Itching       Social History:     Marital Status: /Civil Union   Occupation: unknown   Patient Pre-hospital Living Situation: lives at home   Patient Pre-hospital Level of Mobility: ambulates  Patient Pre-hospital Diet Restrictions: none   Substance Use History:   Social History     Substance and Sexual Activity   Alcohol Use Yes    Frequency: 2-4 times a month    Drinks per session: 1 or 2    Binge frequency: Never    Comment: standard drink 2 x month     Social History     Tobacco Use   Smoking Status Never Smoker   Smokeless Tobacco Never Used     Social History     Substance and Sexual Activity   Drug Use No       Family History:    Family History   Problem Relation Age of Onset    Diabetes Mother     Skin cancer Mother     Diabetes Father        Physical Exam:     Vitals:   Blood Pressure: 146/74 (04/02/21 2315)  Pulse: (!) 120 (04/02/21 2315)  Temperature: 98 5 °F (36 9 °C) (04/02/21 2206)  Temp Source: Oral (04/02/21 2206)  Respirations: 22 (04/02/21 2315)  Height: 5' 7" (170 2 cm) (04/02/21 2206)  Weight - Scale: (!) 259 kg (570 lb) (04/02/21 2206)  SpO2: 98 % (04/02/21 2315)    Physical Exam  Constitutional:       General: She is not in acute distress  Appearance: She is obese  She is not diaphoretic  HENT:      Head: Normocephalic and atraumatic  Mouth/Throat:      Mouth: Mucous membranes are moist       Pharynx: Oropharynx is clear  No oropharyngeal exudate     Eyes:      General: Right eye: No discharge  Left eye: No discharge  Conjunctiva/sclera: Conjunctivae normal       Pupils: Pupils are equal, round, and reactive to light  Neck:      Musculoskeletal: Neck supple  No muscular tenderness  Cardiovascular:      Rate and Rhythm: Normal rate and regular rhythm  Pulses: Normal pulses  Heart sounds: Normal heart sounds  No murmur  Pulmonary:      Effort: Pulmonary effort is normal  No respiratory distress  Breath sounds: No wheezing or rales  Comments: Decreased lung sounds   Abdominal:      General: Abdomen is flat  There is no distension  Palpations: Abdomen is soft  Tenderness: There is no abdominal tenderness  Musculoskeletal: Normal range of motion  Right lower leg: No edema  Left lower leg: No edema  Skin:     General: Skin is warm and dry  Capillary Refill: Capillary refill takes less than 2 seconds  Coloration: Skin is not jaundiced  Findings: Erythema and wound present  Comments: Circular lesion of the left lower extremity on the medial aspect just above the malleolus  No significant drainage on my exam   Raise borders with induration extending approximately 2-3 inches past area of erythema   Neurological:      General: No focal deficit present  Mental Status: She is alert and oriented to person, place, and time  Cranial Nerves: No cranial nerve deficit  Psychiatric:         Mood and Affect: Mood normal          Additional Data:     Lab Results: I have personally reviewed pertinent reports        Results from last 7 days   Lab Units 04/02/21  2240   WBC Thousand/uL 11 83*   HEMOGLOBIN g/dL 10 0*   HEMATOCRIT % 34 5*   PLATELETS Thousands/uL 343   NEUTROS PCT % 69   LYMPHS PCT % 22   MONOS PCT % 6   EOS PCT % 1     Results from last 7 days   Lab Units 04/02/21  2240   SODIUM mmol/L 141   POTASSIUM mmol/L 3 9   CHLORIDE mmol/L 104   CO2 mmol/L 28   BUN mg/dL 8   CREATININE mg/dL 0  74   ANION GAP mmol/L 9   CALCIUM mg/dL 9 1   ALBUMIN g/dL 2 5*   TOTAL BILIRUBIN mg/dL 0 21   ALK PHOS U/L 103   ALT U/L 25   AST U/L 18   GLUCOSE RANDOM mg/dL 216*     Results from last 7 days   Lab Units 04/02/21  2240   INR  0 98             Results from last 7 days   Lab Units 04/02/21  2240   LACTIC ACID mmol/L 2 6*       Imaging: I have personally reviewed pertinent reports  XR chest 1 view portable   ED Interpretation by Haydee Sharma MD (04/03 2069)   Chest xray shows no evidence of focal consolidation, pleural effusion, pneumothorax, or other acute pulmonary pathology as interpreted by me  CT lower extremity w contrast left    (Results Pending)       EKG, Pathology, and Other Studies Reviewed on Admission:   · CXR: no obvious disease on my read     Allscripts / Epic Records Reviewed: Yes     ** Please Note: This note has been constructed using a voice recognition system   **

## 2021-04-03 NOTE — ASSESSMENT & PLAN NOTE
· Evidenced by tachycardia, tachypnea, lower extremity cellulitis  · Lactic acid 2 6  · Started on Rocephin, vancomycin in the emergency department  · Check MRSA swab  · Will continue with high-dose Ancef for now  · Follow blood cultures    · Trend white blood cell count, and fever  · Patient is currently hemodynamically stable

## 2021-04-03 NOTE — UTILIZATION REVIEW
Notification of Inpatient Admission/Inpatient Authorization Request   This is a Notification of Inpatient Admission for [place]  Be advised that this patient was admitted to our facility under Inpatient Status  Contact Michael Mariano at [phone] for additional admission information  [facility] DE[facility]  DEDICATED [phone]  Patient Name:   Regi Allen   YOB: 1990       [address] [facility]   [address]:   [address]  Tax ID: [id number]  NPI: [id number] Attending Provider/NPI:  Address:  Phone: Betty Deleon Md [[id number]]  Same as the facility  [phone]   Place of Service Code: 24 Place of Service Name:  [address] [place] [address]   Start Date: 4/3/21 0045     Discharge Date & Time: No discharge date for patient encounter  Type of Admission: Inpatient Status Discharge Disposition   (if discharged): Home/Self Care   Patient Diagnoses: Cellulitis of left lower extremity [W01 962]  Severe sepsis ([address] ) [A41 9, R65 20]     Orders: Admission Orders (From admission, onward)     Ordered        04/03/21 0045  Inpatient Admission  Once                    Assigned Utilization Review Contact: Michael Mariano  Utilization   Network Utilization Review Department  Phone: [phone]; Fax [phone]  Email: [email or web address]   ATTENTION PAYERS: Please call the assigned Utilization  directly with any questions or concerns ALL voicemails in the department are confidential  Send all requests for admission clinical reviews, approved or denied determinations and any other requests to dedicated fax number belonging to the campus where the patient is receiving treatment

## 2021-04-03 NOTE — ASSESSMENT & PLAN NOTE
· Morbidly obese with BMI 89   · Counseled on weight loss  · Check a1c given elevated sugars and likely glucose intolerance

## 2021-04-03 NOTE — QUICK NOTE
Patient is seen and examined by the bedside today  She stated that she did take Keflex and clindamycin for the cellulitis before however did not provide much improvement  Currently, she denies fever, chills, chest pain, shortness of breath, abdominal pain, nausea, vomiting or diarrhea  She noted is continuing drainage from her wound  Will continue IV Ancef for now and will await CT of lower extremity result to rule out osteomyelitis  All questions were answered today

## 2021-04-03 NOTE — SEPSIS NOTE
Sepsis Note   Nathan Cronin 35 y o  female MRN: [id number]  Unit/Bed#: FT 02 Encounter: [id number]      qSOFA     [address] Name 04/02/21 2315 04/02/21 2206             Altered mental status GCS < 15  --  --       Respiratory Rate > / =22  1  1       Systolic BP < / =315  0  --       Q Sofa Score  1  --           Initial Sepsis Screening     Row Name 04/03/21 0045                Is the patient's history suggestive of a new or worsening infection? (!) Yes (Proceed)  -BW        Suspected source of infection  soft tissue  -BW        Are two or more of the following signs & symptoms of infection both present and new to the patient? (!) Yes (Proceed)  -BW        Indicate SIRS criteria  Tachycardia > 90 bpm;Tachypnea > 20 resp per min  -BW        If the answer is yes to both questions, suspicion of sepsis is present  --        If severe sepsis is present AND tissue hypoperfusion perists in the hour after fluid resuscitation or lactate > 4, the patient meets criteria for SEPTIC SHOCK  --        Are any of the following organ dysfunction criteria present within 6 hours of suspected infection and SIRS criteria that are NOT considered to be chronic conditions?   (!) Yes  -BW        Organ dysfunction  Lactate > 2 0 mmol/L  -BW        Date of presentation of severe sepsis  04/02/21  -BW        Time of presentation of severe sepsis  2340  -BW        Tissue hypoperfusion persists in the hour after crystalloid fluid administration, evidenced, by either:  --        Was hypotension present within one hour of the conclusion of crystalloid fluid administration?  --        Date of presentation of septic shock  --        Time of presentation of septic shock  --          User Key  (r) = Recorded By, (t) = Taken By, (c) = Cosigned By    [address] Name Provider Type     Matias Skinner MD Physician

## 2021-04-03 NOTE — ED PROVIDER NOTES
History  Chief Complaint   Patient presents with    Wound Check     Patient reports having left posterior lower leg wound presents since December - completed x2 courses oral antibiotics (last in [place]) w/o improvement to wound  Reports redness, warmth, increased drainage from area  (+) Sepsis criteria  This is a 35 y o  old female who presents to the ED for evaluation of leg wound  His wound left posterior leg been there since December, she is on off antibiotics  Last course was in February  She states over the last few days has gotten more red hot painful and has a discharge  She is put topical antibiotic ointment on it  She is morbidly obese  She has severe lower extremity edema  She does not know if it is more swollen  No fever chills  Patient was COVID positive a few months ago  No continued COVID symptoms  Wound Check       Prior to Admission Medications   Prescriptions Last Dose Informant Patient Reported? Taking? albuterol (PROVENTIL HFA,VENTOLIN HFA) 90 mcg/act inhaler More than a month at Unknown time  No No   Sig: Inhale 2 puffs every 4 (four) hours as needed for wheezing or shortness of breath      Facility-Administered Medications: None     Past Medical History:   Diagnosis Date    Allergic rhinitis     Anemia     Arthritis     Asthma     seasonal    DUB (dysfunctional uterine bleeding)     Iliotibial band syndrome of left side     Obesity     PCOS (polycystic ovarian syndrome)     Ruptured lumbar disc      Past Surgical History:   Procedure Laterality Date    NO PAST SURGERIES      [place] HYSTEROSCOPY,W/ENDO BX N/A 1/15/2018    Procedure: DILATATION AND CURETTAGE (D&C) WITH HYSTEROSCOPY;  Surgeon: Katharina Brown MD;  Location: [facility];  Service: Gynecology     Family History   Problem Relation Age of Onset    Diabetes Mother     Skin cancer Mother     Diabetes Father      I have reviewed and agree with the history as documented      E-Cigarette/Vaping    E-Cigarette Use Never User      E-Cigarette/Vaping Substances     Social History     Tobacco Use    Smoking status: Never Smoker    Smokeless tobacco: Never Used   Substance Use Topics    Alcohol use: Yes     Frequency: 2-4 times a month     Drinks per session: 1 or 2     Binge frequency: Never     Comment: standard drink 2 x month    Drug use: No     Review of Systems   Constitutional: Negative for chills, fatigue, fever and unexpected weight change  HENT: Negative for congestion, rhinorrhea and sore throat  Eyes: Negative for redness and visual disturbance  Respiratory: Negative for cough and shortness of breath  Cardiovascular: Negative for chest pain and leg swelling  Gastrointestinal: Negative for abdominal pain, constipation, diarrhea, nausea and vomiting  Endocrine: Negative for cold intolerance and heat intolerance  Genitourinary: Negative for dysuria, frequency and urgency  Musculoskeletal: Negative for back pain  Skin: Positive for wound  Negative for rash  Neurological: Negative for dizziness, syncope and numbness  All other systems reviewed and are negative  Physical Exam  Physical Exam  Vitals signs and nursing note reviewed  Constitutional:       General: She is not in acute distress  Appearance: She is well-developed  She is obese  She is not diaphoretic  HENT:      Head: Normocephalic and atraumatic  Right Ear: External ear normal       Left Ear: External ear normal       Nose: Nose normal       Mouth/Throat:      Mouth: Mucous membranes are moist       Pharynx: No oropharyngeal exudate  Eyes:      Conjunctiva/sclera: Conjunctivae normal       Pupils: Pupils are equal, round, and reactive to light  Neck:      Musculoskeletal: Normal range of motion and neck supple  Cardiovascular:      Rate and Rhythm: Normal rate and regular rhythm  Heart sounds: Normal heart sounds  No murmur  No gallop      Pulmonary:      Effort: Pulmonary effort is normal  Breath sounds: Normal breath sounds  No wheezing  Chest:      Chest wall: No tenderness  Abdominal:      General: Bowel sounds are normal  There is no distension  Palpations: Abdomen is soft  Tenderness: There is no abdominal tenderness  There is no guarding or rebound  Musculoskeletal: Normal range of motion  General: No tenderness or deformity  Right lower leg: Edema (chronic) present  Left lower leg: Edema (chronic) present  Lymphadenopathy:      Cervical: No cervical adenopathy  Skin:     General: Skin is warm and dry  Findings: Rash (Posterior LLE, with drainage) present  No erythema  Neurological:      Mental Status: She is alert and oriented to person, place, and time  Cranial Nerves: No cranial nerve deficit  Vital Signs  ED Triage Vitals   Temperature Pulse Respirations Blood Pressure SpO2   04/02/21 2206 04/02/21 2206 04/02/21 2206 04/02/21 2315 04/02/21 2206   98 5 °F (36 9 °C) (S) (!) 140 (S) (!) 24 146/74 96 %      Temp Source Heart Rate Source Patient Position - Orthostatic VS BP Location FiO2 (%)   04/02/21 2206 04/02/21 2206 04/02/21 2206 04/02/21 2206 --   Oral Monitor Sitting Right arm       Pain Score       04/02/21 2206       4         ED Medications  Medications   vancomycin (VANCOCIN) 2,000 mg in sodium chloride 0 9 % 500 mL IVPB (has no administration in time range)   lactated ringers bolus 1,000 mL (0 mL Intravenous Stopped 4/3/21 0013)   ceftriaxone (ROCEPHIN) 1 g/50 mL in dextrose IVPB (0 mg Intravenous Stopped 4/3/21 0050)     Diagnostic Studies  Results Reviewed     Procedure Component Value Units Date/Time    MRSA culture [[id number]] Collected: 04/03/21 0014    Lab Status:  In process Specimen: Nares from Nose Updated: 04/03/21 0026    hCG, qualitative pregnancy [[id number]]     Lab Status: No result Specimen: Blood     Comprehensive metabolic panel [[id number]]  (Abnormal) Collected: 04/02/21 2240    Lab Status: Final result Specimen: Blood from Arm, Right Updated: 04/02/21 2342     Sodium 141 mmol/L      Potassium 3 9 mmol/L      Chloride 104 mmol/L      CO2 28 mmol/L      ANION GAP 9 mmol/L      BUN 8 mg/dL      Creatinine 0 74 mg/dL      Glucose 216 mg/dL      Calcium 9 1 mg/dL      Corrected Calcium 10 3 mg/dL      AST 18 U/L      ALT 25 U/L      Alkaline Phosphatase 103 U/L      Total Protein 7 3 g/dL      Albumin 2 5 g/dL      Total Bilirubin 0 21 mg/dL      eGFR 110 ml/min/1 73sq m     Narrative:      [place] guidelines for Chronic Kidney Disease (CKD):     Stage 1 with normal or high GFR (GFR > 90 mL/min/1 73 square meters)    Stage 2 Mild CKD (GFR = 60-89 mL/min/1 73 square meters)    Stage 3A Moderate CKD (GFR = 45-59 mL/min/1 73 square meters)    Stage 3B Moderate CKD (GFR = 30-44 mL/min/1 73 square meters)    Stage 4 Severe CKD (GFR = 15-29 mL/min/1 73 square meters)    Stage 5 End Stage CKD (GFR <15 mL/min/1 73 square meters)  Note: GFR calculation is accurate only with a steady state creatinine    APTT [[id number]]  (Normal) Collected: 04/02/21 2240    Lab Status: Final result Specimen: Blood from Arm, Right Updated: 04/02/21 2342     PTT 33 seconds     Protime-INR [[id number]]  (Normal) Collected: 04/02/21 2240    Lab Status: Final result Specimen: Blood from Arm, Right Updated: 04/02/21 2342     Protime 13 0 seconds      INR 0 98    Lactic acid [[id number]]  (Abnormal) Collected: 04/02/21 2240    Lab Status: Final result Specimen: Blood from Arm, Right Updated: 04/02/21 2340     LACTIC ACID 2 6 mmol/L     Narrative:      Result may be elevated if tourniquet was used during collection      Lactic acid 2 Hours [[id number]]     Lab Status: No result Specimen: Blood     Troponin I [[id number]]  (Normal) Collected: 04/02/21 2240    Lab Status: Final result Specimen: Blood from Arm, Right Updated: 04/02/21 2336     Troponin I <0 02 ng/mL     CBC and differential [[id number]]  (Abnormal) Collected: 04/02/21 2240    Lab Status: Final result Specimen: Blood from Arm, Right Updated: 04/02/21 2312     WBC 11 83 Thousand/uL      RBC 4 43 Million/uL      Hemoglobin 10 0 g/dL      Hematocrit 34 5 %      MCV 78 fL      MCH 22 6 pg      MCHC 29 0 g/dL      RDW 16 5 %      MPV 9 9 fL      Platelets 483 Thousands/uL      nRBC 0 /100 WBCs      Neutrophils Relative 69 %      Immat GRANS % 2 %      Lymphocytes Relative 22 %      Monocytes Relative 6 %      Eosinophils Relative 1 %      Basophils Relative 0 %      Neutrophils Absolute 8 12 Thousands/µL      Immature Grans Absolute 0 26 Thousand/uL      Lymphocytes Absolute 2 64 Thousands/µL      Monocytes Absolute 0 67 Thousand/µL      Eosinophils Absolute 0 11 Thousand/µL      Basophils Absolute 0 03 Thousands/µL     Blood culture #2 [[id number]] Collected: 04/02/21 2240    Lab Status: In process Specimen: Blood from Arm, Right Updated: 04/02/21 2307    Procalcitonin with AM Reflex [[id number]] Collected: 04/02/21 2240    Lab Status: In process Specimen: Blood from Arm, Right Updated: 04/02/21 2306    Blood culture #1 [[id number]] Collected: 04/02/21 2249    Lab Status: In process Specimen: Blood from Arm, Right Updated: 04/02/21 2306        XR chest 1 view portable   ED Interpretation by Karen Negrete MD (04/03 1015)   Chest xray shows no evidence of focal consolidation, pleural effusion, pneumothorax, or other acute pulmonary pathology as interpreted by me  CT lower extremity w contrast left    (Results Pending)     Procedures  ECG 12 Lead Documentation Only    Date/Time: 4/2/2021 11:10 PM  Performed by: Karen Negrete MD  Authorized by: Karen Negrete MD     Indications / Diagnosis:  Tachycardia   ECG reviewed by me, the ED Provider: yes    Patient location:  ED  Comments:      Sinus tachycardia, rate 122, normal intervals, normal axis, no acute ST, T-wave changes  No previous EKGs available    CriticalCare Time  Performed by: Karen Negrete MD  Authorized by: Nicki Tsang MD     Critical care provider statement:     Critical care time (minutes):  33    Critical care time was exclusive of:  Separately billable procedures and treating other patients and teaching time    Critical care was time spent personally by me on the following activities:  Obtaining history from patient or surrogate, development of treatment plan with patient or surrogate, evaluation of patient's response to treatment, re-evaluation of patient's condition, ordering and performing treatments and interventions, ordering and review of laboratory studies, review of old charts, examination of patient and ordering and review of radiographic studies        ED Course      A/P: This is a 35 y o  female who presents to the ED for evaluation of wound check  Likely cellulitis  Screened positive for SIRS based on HR and RR  Septic work up ordered  IVF ordered  Will observe for dispo of admit vs dc  Lactic 2 6 - severe sepsis  Broad spectrum ABX ordered  Will admit to medicine  Per CT tech, CT scan unable to be obtained  Will admit to Medicine and for study to them based on their evaluation patient's clinical progress  At this time I have low suspicion for NSTI  MDM    Disposition  Final diagnoses:   Severe sepsis ([address] )   Cellulitis of left lower extremity     Time reflects when diagnosis was documented in both MDM as applicable and the Disposition within this note     Time User Action Codes Description Comment    4/3/2021 12:44 AM Kyra Lam [A41 9,  R66 20] Severe sepsis ([address] )     4/3/2021 12:44 AM Kyra Lam [S20 339] Cellulitis of left lower extremity       ED Disposition     ED Disposition Condition Date/Time Comment    Admit Stable Sat Apr 3, 2021 12:44 AM Case was discussed with BLA[facility] and the patient's admission status was agreed to be Admission Status: inpatient status to the service of Dr Chivo Dick           Follow-up Information    None         Patient's Medications Discharge Prescriptions    No medications on file     No discharge procedures on file      PDMP Review     None          ED Provider  Electronically Signed by           Xavi Estrada MD  04/03/21 0701

## 2021-04-03 NOTE — PLAN OF CARE
Problem: Potential for Falls  Goal: Patient will remain free of falls  Description: INTERVENTIONS:  - Assess patient frequently for physical needs  -  Identify cognitive and physical deficits and behaviors that affect risk of falls    -  [place] fall precautions as indicated by assessment   - Educate patient/family on patient safety including physical limitations  - Instruct patient to call for assistance with activity based on assessment  - Modify environment to reduce risk of injury  - Consider OT/PT consult to assist with strengthening/mobility  Outcome: Progressing     Problem: Prexisting or High Potential for Compromised Skin Integrity  Goal: Skin integrity is maintained or improved  Description: INTERVENTIONS:  - Identify patients at risk for skin breakdown  - Assess and monitor skin integrity  - Assess and monitor nutrition and hydration status  - Monitor labs   - Assess for incontinence   - Turn and reposition patient  - Assist with mobility/ambulation  - Relieve pressure over bony prominences  - Avoid friction and shearing  - Provide appropriate hygiene as needed including keeping skin clean and dry  - Evaluate need for skin moisturizer/barrier cream  - Collaborate with interdisciplinary team   - Patient/family teaching  - Consider wound care consult   Outcome: Progressing     Problem: PAIN - ADULT  Goal: Verbalizes/displays adequate comfort level or baseline comfort level  Description: Interventions:  - Encourage patient to monitor pain and request assistance  - Assess pain using appropriate pain scale  - Administer analgesics based on type and severity of pain and evaluate response  - Implement non-pharmacological measures as appropriate and evaluate response  - Consider cultural and social influences on pain and pain management  - Notify physician/advanced practitioner if interventions unsuccessful or patient reports new pain  Outcome: Progressing     Problem: INFECTION - ADULT  Goal: Absence or prevention of progression during hospitalization  Description: INTERVENTIONS:  - Assess and monitor for signs and symptoms of infection  - Monitor lab/diagnostic results  - Monitor all insertion sites, i e  indwelling lines, tubes, and drains  - Monitor endotracheal if appropriate and nasal secretions for changes in amount and color  - [place] appropriate cooling/warming therapies per order  - Administer medications as ordered  - Instruct and encourage patient and family to use good hand hygiene technique  - Identify and instruct in appropriate isolation precautions for identified infection/condition  Outcome: Progressing     Problem: SAFETY ADULT  Goal: Patient will remain free of falls  Description: INTERVENTIONS:  - Assess patient frequently for physical needs  -  Identify cognitive and physical deficits and behaviors that affect risk of falls    -  [place] fall precautions as indicated by assessment   - Educate patient/family on patient safety including physical limitations  - Instruct patient to call for assistance with activity based on assessment  - Modify environment to reduce risk of injury  - Consider OT/PT consult to assist with strengthening/mobility  Outcome: Progressing  Goal: Maintain or return to baseline ADL function  Description: INTERVENTIONS:  -  Assess patient's ability to carry out ADLs; assess patient's baseline for ADL function and identify physical deficits which impact ability to perform ADLs (bathing, care of mouth/teeth, toileting, grooming, dressing, etc )  - Assess/evaluate cause of self-care deficits   - Assess range of motion  - Assess patient's mobility; develop plan if impaired  - Assess patient's need for assistive devices and provide as appropriate  - Encourage maximum independence but intervene and supervise when necessary  - Involve family in performance of ADLs  - Assess for home care needs following discharge   - Consider OT consult to assist with ADL evaluation and planning for discharge  - Provide patient education as appropriate  Outcome: Progressing  Goal: Maintain or return mobility status to optimal level  Description: INTERVENTIONS:  - Assess patient's baseline mobility status (ambulation, transfers, stairs, etc )    - Identify cognitive and physical deficits and behaviors that affect mobility  - Identify mobility aids required to assist with transfers and/or ambulation (gait belt, sit-to-stand, lift, walker, cane, etc )  - [place] fall precautions as indicated by assessment  - Record patient progress and toleration of activity level on Mobility SBAR; progress patient to next Phase/Stage  - Instruct patient to call for assistance with activity based on assessment  - Consider rehabilitation consult to assist with strengthening/weightbearing, etc   Outcome: Progressing     Problem: DISCHARGE PLANNING  Goal: Discharge to home or other facility with appropriate resources  Description: INTERVENTIONS:  - Identify barriers to discharge w/patient and caregiver  - Arrange for needed discharge resources and transportation as appropriate  - Identify discharge learning needs (meds, wound care, etc )  - Arrange for interpretive services to assist at discharge as needed  - Refer to Case Management Department for coordinating discharge planning if the patient needs post-hospital services based on physician/advanced practitioner order or complex needs related to functional status, cognitive ability, or social support system  Outcome: Progressing     Problem: Knowledge Deficit  Goal: Patient/family/caregiver demonstrates understanding of disease process, treatment plan, medications, and discharge instructions  Description: Complete learning assessment and assess knowledge base    Interventions:  - Provide teaching at level of understanding  - Provide teaching via preferred learning methods  Outcome: Progressing

## 2021-04-03 NOTE — QUICK NOTE
I was informed by nursing that patient refused to take insulin  I came to the bedside to explain the risk of hyperglycemia, DKA and possible death  I explained this is just protocol, that she does not need to continue to take insulin after discharge  She continued to refuse with informed consent  Will discontinue sliding scale  I advised the patient to closely follow up with her PCP

## 2021-04-03 NOTE — ASSESSMENT & PLAN NOTE
· Patient with cellulitis of left lower extremity which has been ongoing since December  Patient has been on various oral antibiotics including Keflex which he states is not offered any relief  She states that it wax and wane but currently has been draining over the last couple of days with significant pain    Area is significantly indurated  · High-dose Keflex  · Elevate lower extremity  · Serial monitoring  · Consider ID consult if no improvement

## 2021-04-04 VITALS
WEIGHT: 293 LBS | HEART RATE: 105 BPM | SYSTOLIC BLOOD PRESSURE: 129 MMHG | BODY MASS INDEX: 45.99 KG/M2 | DIASTOLIC BLOOD PRESSURE: 78 MMHG | HEIGHT: 67 IN | OXYGEN SATURATION: 96 % | TEMPERATURE: 97.9 F | RESPIRATION RATE: 16 BRPM

## 2021-04-04 PROBLEM — E11.9 DIABETES (HCC): Status: ACTIVE | Noted: 2021-04-04

## 2021-04-04 PROBLEM — I89.0 LYMPHEDEMA: Status: ACTIVE | Noted: 2021-04-04

## 2021-04-04 LAB
ANION GAP SERPL CALCULATED.3IONS-SCNC: 9 MMOL/L (ref 4–13)
ATRIAL RATE: 127 BPM
BASOPHILS # BLD AUTO: 0.04 THOUSANDS/ΜL (ref 0–0.1)
BASOPHILS NFR BLD AUTO: 1 % (ref 0–1)
BUN SERPL-MCNC: 5 MG/DL (ref 5–25)
CALCIUM SERPL-MCNC: 8.7 MG/DL (ref 8.3–10.1)
CHLORIDE SERPL-SCNC: 103 MMOL/L (ref 100–108)
CO2 SERPL-SCNC: 28 MMOL/L (ref 21–32)
CREAT SERPL-MCNC: 0.63 MG/DL (ref 0.6–1.3)
EOSINOPHIL # BLD AUTO: 0.1 THOUSAND/ΜL (ref 0–0.61)
EOSINOPHIL NFR BLD AUTO: 1 % (ref 0–6)
ERYTHROCYTE [DISTWIDTH] IN BLOOD BY AUTOMATED COUNT: 17.2 % (ref 11.6–15.1)
GFR SERPL CREATININE-BSD FRML MDRD: 122 ML/MIN/1.73SQ M
GLUCOSE SERPL-MCNC: 126 MG/DL (ref 65–140)
GLUCOSE SERPL-MCNC: 130 MG/DL (ref 65–140)
GLUCOSE SERPL-MCNC: 196 MG/DL (ref 65–140)
HCT VFR BLD AUTO: 32.9 % (ref 34.8–46.1)
HGB BLD-MCNC: 9.4 G/DL (ref 11.5–15.4)
IMM GRANULOCYTES # BLD AUTO: 0.18 THOUSAND/UL (ref 0–0.2)
IMM GRANULOCYTES NFR BLD AUTO: 2 % (ref 0–2)
LYMPHOCYTES # BLD AUTO: 2.03 THOUSANDS/ΜL (ref 0.6–4.47)
LYMPHOCYTES NFR BLD AUTO: 23 % (ref 14–44)
MCH RBC QN AUTO: 22.4 PG (ref 26.8–34.3)
MCHC RBC AUTO-ENTMCNC: 28.6 G/DL (ref 31.4–37.4)
MCV RBC AUTO: 79 FL (ref 82–98)
MONOCYTES # BLD AUTO: 0.51 THOUSAND/ΜL (ref 0.17–1.22)
MONOCYTES NFR BLD AUTO: 6 % (ref 4–12)
MRSA NOSE QL CULT: NORMAL
NEUTROPHILS # BLD AUTO: 5.97 THOUSANDS/ΜL (ref 1.85–7.62)
NEUTS SEG NFR BLD AUTO: 67 % (ref 43–75)
NRBC BLD AUTO-RTO: 0 /100 WBCS
P AXIS: 39 DEGREES
PLATELET # BLD AUTO: 287 THOUSANDS/UL (ref 149–390)
PMV BLD AUTO: 9.6 FL (ref 8.9–12.7)
POTASSIUM SERPL-SCNC: 3.7 MMOL/L (ref 3.5–5.3)
PR INTERVAL: 148 MS
PROCALCITONIN SERPL-MCNC: 0.09 NG/ML
QRS AXIS: 1 DEGREES
QRSD INTERVAL: 74 MS
QT INTERVAL: 306 MS
QTC INTERVAL: 436 MS
RBC # BLD AUTO: 4.19 MILLION/UL (ref 3.81–5.12)
SODIUM SERPL-SCNC: 140 MMOL/L (ref 136–145)
T WAVE AXIS: 18 DEGREES
VENTRICULAR RATE: 122 BPM
WBC # BLD AUTO: 8.83 THOUSAND/UL (ref 4.31–10.16)

## 2021-04-04 PROCEDURE — 82948 REAGENT STRIP/BLOOD GLUCOSE: CPT

## 2021-04-04 PROCEDURE — 80048 BASIC METABOLIC PNL TOTAL CA: CPT | Performed by: INTERNAL MEDICINE

## 2021-04-04 PROCEDURE — 84145 PROCALCITONIN (PCT): CPT | Performed by: EMERGENCY MEDICINE

## 2021-04-04 PROCEDURE — 93010 ELECTROCARDIOGRAM REPORT: CPT | Performed by: INTERNAL MEDICINE

## 2021-04-04 PROCEDURE — 99239 HOSP IP/OBS DSCHRG MGMT >30: CPT | Performed by: INTERNAL MEDICINE

## 2021-04-04 PROCEDURE — 85025 COMPLETE CBC W/AUTO DIFF WBC: CPT | Performed by: INTERNAL MEDICINE

## 2021-04-04 RX ORDER — CEPHALEXIN 500 MG/1
1000 CAPSULE ORAL EVERY 8 HOURS
Qty: 30 CAPSULE | Refills: 0 | Status: SHIPPED | OUTPATIENT
Start: 2021-04-04 | End: 2021-04-09

## 2021-04-04 RX ORDER — POTASSIUM CHLORIDE 750 MG/1
20 TABLET, EXTENDED RELEASE ORAL DAILY
Qty: 14 TABLET | Refills: 0 | Status: SHIPPED | OUTPATIENT
Start: 2021-04-04 | End: 2021-04-15

## 2021-04-04 RX ORDER — LOPERAMIDE HYDROCHLORIDE 2 MG/1
2 CAPSULE ORAL 4 TIMES DAILY PRN
Qty: 30 CAPSULE | Refills: 0 | Status: SHIPPED | OUTPATIENT
Start: 2021-04-04 | End: 2021-04-07

## 2021-04-04 RX ORDER — CEPHALEXIN 750 MG/1
750 CAPSULE ORAL 4 TIMES DAILY
Qty: 28 CAPSULE | Refills: 0 | Status: SHIPPED | OUTPATIENT
Start: 2021-04-04 | End: 2021-04-04 | Stop reason: SDUPTHER

## 2021-04-04 RX ORDER — FUROSEMIDE 40 MG/1
40 TABLET ORAL DAILY
Qty: 7 TABLET | Refills: 0 | Status: SHIPPED | OUTPATIENT
Start: 2021-04-05 | End: 2021-04-15

## 2021-04-04 RX ADMIN — ENOXAPARIN SODIUM 60 MG: 60 INJECTION SUBCUTANEOUS at 08:45

## 2021-04-04 RX ADMIN — CEFAZOLIN SODIUM 2000 MG: 2 SOLUTION INTRAVENOUS at 00:11

## 2021-04-04 RX ADMIN — CEFAZOLIN SODIUM 2000 MG: 2 SOLUTION INTRAVENOUS at 08:45

## 2021-04-04 RX ADMIN — FUROSEMIDE 40 MG: 40 TABLET ORAL at 08:45

## 2021-04-04 NOTE — DISCHARGE SUMMARY
[facility]  Discharge- Tashi [place] 1990, 35 y o  female MRN: [id number]  Unit/Bed#: W -01 Encounter: [id number]  Primary Care Provider: No primary care provider on file  Date and time admitted to hospital: 4/2/2021 10:18 PM    * Cellulitis  Assessment & Plan  · Patient with cellulitis of left lower extremity which has been ongoing since December  Patient has been on various oral antibiotics including Keflex which he states is not offered any relief  She states that it wax and wane but currently has been draining over the last couple of days with significant pain  Area is significantly indurated  Treated with supposedly 2000 q 8 and switched to Keflex 750 q 6 for 7 days upon discharge  Plan  · Keflex 1000 q 8 for 5 more days, total 7 days  ·  monitor site  ·  follow-up with wound care outpatient,  Referral provided  ·  can take probiotics for upset stomach, if no relief can take Imodium which has been prescribed  ·   Lasix 40 q d  x1 week  To decrease lower limb swelling and prevent repeated infection  ·  potassium 20 mEq q d  x1 week  ·  follow-up with PCP    Severe sepsis ([facility] [address] )  Assessment & Plan  · Evidenced by tachycardia, tachypnea, lower extremity cellulitis  · Lactic acid 2 6   · on Rocephin, vancomycin in the emergency department      hemodynamically stable  Resolved  Supper blood cultures negative will follow-up with patient if result anything  Diabetes [facility])  Assessment & Plan  Lab Results   Component Value Date    HGBA1C 6 6 (H) 04/02/2021       Recent Labs     04/03/21  1618 04/03/21  2139 04/04/21  0727 04/04/21  1105   POCGLU 163* 114 130 196*       Blood Sugar Average: Last 72 hrs:  (P) 150 75        New onset diabetes diagnosed with HbA1c of 6 6  Plan   · Lifestyle modification follow-up with PCP        Anemia  Assessment & Plan  · Hemoglobin 10 0  · MCV 79   ·  iron panel shows iron 30, TIBC 235, ferritin 46       plan   · Monitor with PCP    Morbid obesity ([facility] [address] )  Assessment & Plan  · Morbidly obese with BMI 89   · Counseled on weight loss  · HbA1c 6 6     plan   · Patient is eligible for COVID shot in a few months as she recently had COVID last month  Discharging Resident Physician: Alyssa Newton MD  Attending: Jeanne Vázquez MD  PCP: No primary care provider on file  Admission Date: 4/2/2021  Discharge Date: 04/04/21    Disposition:     Home    Reason for Admission:  Lower extremity wound    Consultations During Hospital Stay:  ·  none    Procedures Performed:     ·  none    Significant Findings / Test Results:     ·  CT of  leftlower extremity 4/3/2021:  No CT evidence of necrotizing   infection  Skin and soft tissue swelling surrounding the lower extremity without organized collection  ·   Chest x-ray  4/2/21: No acute cardiopulmonary disease    Incidental Findings:   ·  none     Test Results Pending at Discharge (will require follow up):   ·  none     Outpatient Tests Requested:  ·  none    Complications:   none    Hospital Course:     Veronica Lazo is a 35 y o  female patient who originally presented to the hospital on 4/2/2021 due to   Left extremity wound which has started since December  History of obesity, anemia,  Seasonal allergies and COVID 1 month ago  Presents to the emergency department for left lower extremity erythema, edema, and wound which is draining  Patient states that this initially began in December and has been ongoing since then appearing on and off  She states that she does have some drainage from the wound recently  Has been placed on multiple oral antibiotics including Keflex which he states does not help  She does have significant pain, and induration around the wound  She denies any trauma, or puncturing to that area  No fevers, chills, chest pain, shortness of breath, abdominal pain, nausea, vomiting diarrhea  CT  Did not show any  Necrotizing fasciitis    Patient continues to be stable and erythema around hyperkeratotic area improved significantly   On antibiotics   Cephazolin 2 g  Patient has no systemic signs of illness  She is stable for discharge and is requesting to go home  Will switch her to Keflex 750 mg due to body habitus q 6 for 7 days  Will also start her on Lasix 40 Q d  to decrease swelling and lymphedema to prevent further cellulitis  Can follow-up with Wound Care for further recommendations outpatient  Will add potassium chloride 20 mEq q d  as potassium may decreased  Follow-up with BMP in 1 week and discuss with PCP  Requires any PCP and referral give to  Resident clinic  Patient is agreeable to above plan  Condition at Discharge: stable     Discharge Day Visit / Exam:     Subjective:   Patient is feeling much better today  She is okay for discharge and she is willing to follow-up with wound care intake oral antibiotics so that she can go home  He does not have any systemic signs of illness nor does she have any chest pain, palpitations, abdominal pain, urinary or bowel complaints  The area is not tender it only is irritated with her being against bedding  Otherwise she does not feel any pain no tenderness at site  Vitals: Blood Pressure: 129/78 (04/04/21 0728)  Pulse: 105 (04/04/21 0728)  Temperature: 97 9 °F (36 6 °C) (04/04/21 0728)  Temp Source: Oral (04/02/21 2206)  Respirations: 16 (04/04/21 0728)  Height: 5' 7" (170 2 cm) (04/02/21 2206)  Weight - Scale: (!) 259 kg (570 lb) (04/02/21 2206)  SpO2: 96 % (04/04/21 0728)  Exam:   Physical Exam  Vitals signs and nursing note reviewed  Constitutional:       General: She is not in acute distress  Appearance: Normal appearance  She is obese  She is not ill-appearing, toxic-appearing or diaphoretic  HENT:      Right Ear: External ear normal       Left Ear: External ear normal    Eyes:      General: No scleral icterus  Right eye: No discharge  Left eye: No discharge        Extraocular Movements: Extraocular movements intact  Cardiovascular:      Rate and Rhythm: Normal rate and regular rhythm  Pulses: Normal pulses  Heart sounds: Normal heart sounds  Pulmonary:      Effort: Pulmonary effort is normal       Breath sounds: Normal breath sounds  Abdominal:      General: Abdomen is flat  Bowel sounds are normal  There is no distension  Tenderness: There is no abdominal tenderness  There is no guarding  Musculoskeletal:         General: Swelling present  Right lower leg: Edema present  Left lower leg: Edema present  Skin:     General: Skin is warm  Comments: Erythema surrounding   Hyperkeratotic skin significantly improved  Neurological:      General: No focal deficit present  Mental Status: She is alert and oriented to person, place, and time  Psychiatric:         Mood and Affect: Mood normal          Behavior: Behavior normal          Thought Content: Thought content normal          Judgment: Judgment normal        TODAY:      PREVIOUS:           Discussion with Family:  Will Speak with family if patient is willing  Discharge instructions/Information to patient and family:   See after visit summary for information provided to patient and family  Provisions for Follow-Up Care:  See after visit summary for information related to follow-up care and any pertinent home health orders  Planned Readmission:  none     Discharge Medications:  See after visit summary for reconciled discharge medications provided to patient and family        ** Please Note: This note has been constructed using a voice recognition system **

## 2021-04-04 NOTE — ASSESSMENT & PLAN NOTE
Lab Results   Component Value Date    HGBA1C 6 6 (H) 04/02/2021       Recent Labs     04/03/21  1618 04/03/21  2139 04/04/21  0727   POCGLU 163* 114 130       Blood Sugar Average: Last 72 hrs:  (P) 370 [id number]        New onset diabetes diagnosed with HbA1c of 6 6      Plan   · Lifestyle modification follow-up with PCP

## 2021-04-04 NOTE — ASSESSMENT & PLAN NOTE
Lab Results   Component Value Date    HGBA1C 6 6 (H) 04/02/2021       Recent Labs     04/03/21  1618 04/03/21  2139 04/04/21  0727 04/04/21  1105   POCGLU 163* 114 130 196*       Blood Sugar Average: Last 72 hrs:  (P) 150 75        New onset diabetes diagnosed with HbA1c of 6 6      Plan   · Lifestyle modification follow-up with PCP

## 2021-04-04 NOTE — ASSESSMENT & PLAN NOTE
· Morbidly obese with BMI 89   · Counseled on weight loss  · HbA1c 6 6     plan   · Patient is eligible for COVID shot in a few months as she recently had COVID last month

## 2021-04-04 NOTE — DISCHARGE INSTR - AVS FIRST PAGE
Dear Yesi Cruz,     It was our pleasure to care for you here at [facility]  It is our hope that we were always able to exceed the expected standards for your care during your stay  You were hospitalized due to  Left leg cellulitis  You were cared for on the  4th floor by Jeffery Ruiz MD under the service of Junie Horner MD with the [facility] who covers for your primary care physician (PCP), No primary care provider on file  , while you were hospitalized  If you have any questions or concerns related to this hospitalization, you may contact us at [phone]  For follow up as well as any medication refills, we recommend that you follow up with your primary care physician  A registered nurse will reach out to you by phone within a few days after your discharge to answer any additional questions that you may have after going home  However, at this time we provide for you here, the most important instructions / recommendations at discharge:     · Notable Medication Adjustments -   ·  Keflex 750 every 6 hours for 7 days  ·  Lasix 40 mg daily for 1 week  ·  potassium 20 mEq daily for 1 week  ·  Imodium given for possible diarrhea due to antibiotics if probiotics do not work  · Testing Required after Discharge -   ·  BMP in 1 week  ·   Follow-up with wound care  · Important follow up information -   ·  follow-up with PCP for BMP review  · Other Instructions -   ·  please try to ensure that there is minimal swelling in lower limbs by elevating legs and taking Lasix pill  This will help to prevent further  Cellulitis  ·  for your new onset diabetes, please adhere to a diabetic friendly diet such as increasing fruits and vegetables and decreasing refined sugars and fast foods   And foods high in  sugar    · Please review this entire after visit summary as additional general instructions including medication list, appointments, activity, diet, any pertinent wound care, and other additional recommendations from your care team that may be provided for you        Sincerely,     Alyssa Newton MD

## 2021-04-04 NOTE — ASSESSMENT & PLAN NOTE
· Patient with cellulitis of left lower extremity which has been ongoing since December  Patient has been on various oral antibiotics including Keflex which he states is not offered any relief  She states that it wax and wane but currently has been draining over the last couple of days with significant pain  Area is significantly indurated  Treated with supposedly 2000 q 8 and switched to Keflex 750 q 6 for 7 days upon discharge  Plan  · Keflex 750 q 6 7 days  ·  monitor site  ·  follow-up with wound care outpatient,  Referral provided  ·  can take probiotics for upset stomach, if no relief can take Imodium which has been prescribed    ·   Lasix 40 q d  x1 week  To decrease lower limb swelling and prevent repeated infection  ·  potassium 20 mEq q d  x1 week  ·  follow-up with PCP

## 2021-04-04 NOTE — ASSESSMENT & PLAN NOTE
· Patient with cellulitis of left lower extremity which has been ongoing since December  Patient has been on various oral antibiotics including Keflex which he states is not offered any relief  She states that it wax and wane but currently has been draining over the last couple of days with significant pain  Area is significantly indurated  Treated with supposedly 2000 q 8 and switched to Keflex 750 q 6 for 7 days upon discharge  Plan  · Keflex 1000 q 8 for 5 more days, total 7 days  ·  monitor site  ·  follow-up with wound care outpatient,  Referral provided  ·  can take probiotics for upset stomach, if no relief can take Imodium which has been prescribed    ·   Lasix 40 q d  x1 week  To decrease lower limb swelling and prevent repeated infection  ·  potassium 20 mEq q d  x1 week  ·  follow-up with PCP

## 2021-04-04 NOTE — ASSESSMENT & PLAN NOTE
· Evidenced by tachycardia, tachypnea, lower extremity cellulitis  · Lactic acid 2 6   · on Rocephin, vancomycin in the emergency department      hemodynamically stable  Resolved  Supper blood cultures negative will follow-up with patient if result anything

## 2021-04-04 NOTE — ASSESSMENT & PLAN NOTE
· Hemoglobin 10 0  · MCV 79   ·  iron panel shows iron 30, TIBC 235, ferritin 46       plan   · Monitor with PCP

## 2021-04-04 NOTE — PLAN OF CARE
Problem: Potential for Falls  Goal: Patient will remain free of falls  Description: INTERVENTIONS:  - Assess patient frequently for physical needs  -  Identify cognitive and physical deficits and behaviors that affect risk of falls    -  [place] fall precautions as indicated by assessment   - Educate patient/family on patient safety including physical limitations  - Instruct patient to call for assistance with activity based on assessment  - Modify environment to reduce risk of injury  - Consider OT/PT consult to assist with strengthening/mobility  Outcome: Adequate for Discharge     Problem: Prexisting or High Potential for Compromised Skin Integrity  Goal: Skin integrity is maintained or improved  Description: INTERVENTIONS:  - Identify patients at risk for skin breakdown  - Assess and monitor skin integrity  - Assess and monitor nutrition and hydration status  - Monitor labs   - Assess for incontinence   - Turn and reposition patient  - Assist with mobility/ambulation  - Relieve pressure over bony prominences  - Avoid friction and shearing  - Provide appropriate hygiene as needed including keeping skin clean and dry  - Evaluate need for skin moisturizer/barrier cream  - Collaborate with interdisciplinary team   - Patient/family teaching  - Consider wound care consult   Outcome: Adequate for Discharge     Problem: PAIN - ADULT  Goal: Verbalizes/displays adequate comfort level or baseline comfort level  Description: Interventions:  - Encourage patient to monitor pain and request assistance  - Assess pain using appropriate pain scale  - Administer analgesics based on type and severity of pain and evaluate response  - Implement non-pharmacological measures as appropriate and evaluate response  - Consider cultural and social influences on pain and pain management  - Notify physician/advanced practitioner if interventions unsuccessful or patient reports new pain  Outcome: Adequate for Discharge     Problem: INFECTION - ADULT  Goal: Absence or prevention of progression during hospitalization  Description: INTERVENTIONS:  - Assess and monitor for signs and symptoms of infection  - Monitor lab/diagnostic results  - Monitor all insertion sites, i e  indwelling lines, tubes, and drains  - Monitor endotracheal if appropriate and nasal secretions for changes in amount and color  - [place] appropriate cooling/warming therapies per order  - Administer medications as ordered  - Instruct and encourage patient and family to use good hand hygiene technique  - Identify and instruct in appropriate isolation precautions for identified infection/condition  Outcome: Adequate for Discharge     Problem: SAFETY ADULT  Goal: Patient will remain free of falls  Description: INTERVENTIONS:  - Assess patient frequently for physical needs  -  Identify cognitive and physical deficits and behaviors that affect risk of falls    -  [place] fall precautions as indicated by assessment   - Educate patient/family on patient safety including physical limitations  - Instruct patient to call for assistance with activity based on assessment  - Modify environment to reduce risk of injury  - Consider OT/PT consult to assist with strengthening/mobility  Outcome: Adequate for Discharge  Goal: Maintain or return to baseline ADL function  Description: INTERVENTIONS:  -  Assess patient's ability to carry out ADLs; assess patient's baseline for ADL function and identify physical deficits which impact ability to perform ADLs (bathing, care of mouth/teeth, toileting, grooming, dressing, etc )  - Assess/evaluate cause of self-care deficits   - Assess range of motion  - Assess patient's mobility; develop plan if impaired  - Assess patient's need for assistive devices and provide as appropriate  - Encourage maximum independence but intervene and supervise when necessary  - Involve family in performance of ADLs  - Assess for home care needs following discharge   - Consider OT consult to assist with ADL evaluation and planning for discharge  - Provide patient education as appropriate  Outcome: Adequate for Discharge  Goal: Maintain or return mobility status to optimal level  Description: INTERVENTIONS:  - Assess patient's baseline mobility status (ambulation, transfers, stairs, etc )    - Identify cognitive and physical deficits and behaviors that affect mobility  - Identify mobility aids required to assist with transfers and/or ambulation (gait belt, sit-to-stand, lift, walker, cane, etc )  - [place] fall precautions as indicated by assessment  - Record patient progress and toleration of activity level on Mobility SBAR; progress patient to next Phase/Stage  - Instruct patient to call for assistance with activity based on assessment  - Consider rehabilitation consult to assist with strengthening/weightbearing, etc   Outcome: Adequate for Discharge     Problem: DISCHARGE PLANNING  Goal: Discharge to home or other facility with appropriate resources  Description: INTERVENTIONS:  - Identify barriers to discharge w/patient and caregiver  - Arrange for needed discharge resources and transportation as appropriate  - Identify discharge learning needs (meds, wound care, etc )  - Arrange for interpretive services to assist at discharge as needed  - Refer to Case Management Department for coordinating discharge planning if the patient needs post-hospital services based on physician/advanced practitioner order or complex needs related to functional status, cognitive ability, or social support system  Outcome: Adequate for Discharge     Problem: Knowledge Deficit  Goal: Patient/family/caregiver demonstrates understanding of disease process, treatment plan, medications, and discharge instructions  Description: Complete learning assessment and assess knowledge base    Interventions:  - Provide teaching at level of understanding  - Provide teaching via preferred learning methods  Outcome: Adequate for Discharge     Problem: Nutrition/Hydration-ADULT  Goal: Nutrient/Hydration intake appropriate for improving, restoring or maintaining nutritional needs  Description: Monitor and assess patient's nutrition/hydration status for malnutrition  Collaborate with interdisciplinary team and initiate plan and interventions as ordered  Monitor patient's weight and dietary intake as ordered or per policy  Utilize nutrition screening tool and intervene as necessary  Determine patient's food preferences and provide high-protein, high-caloric foods as appropriate       INTERVENTIONS:  - Monitor oral intake, urinary output, labs, and treatment plans  - Assess nutrition and hydration status and recommend course of action  - Evaluate amount of meals eaten  - Assist patient with eating if necessary   - Allow adequate time for meals  - Recommend/ encourage appropriate diets, oral nutritional supplements, and vitamin/mineral supplements  - Order, calculate, and assess calorie counts as needed  - Recommend, monitor, and adjust tube feedings and TPN/PPN based on assessed needs  - Assess need for intravenous fluids  - Provide specific nutrition/hydration education as appropriate  - Include patient/family/caregiver in decisions related to nutrition  Outcome: Adequate for Discharge

## 2021-04-07 ENCOUNTER — TELEPHONE (OUTPATIENT)
Dept: INTERNAL MEDICINE CLINIC | Facility: CLINIC | Age: 31
End: 2021-04-07

## 2021-04-08 LAB
BACTERIA BLD CULT: NORMAL
BACTERIA BLD CULT: NORMAL

## 2021-04-08 NOTE — TELEPHONE ENCOUNTER
Pharmacy contacted the office that this pt antibiotic is not covered  Keflex 750 mg   Please call pharmacy to resolve this issue   Thank you this is not an office patient at [facility]

## 2021-04-13 NOTE — TELEPHONE ENCOUNTER
I spoke to Charlie Bower and she has not been able to get in to a PCP and is requesting a virtual appointment due to lack of transportation  She will do a virtual appt on Thursday  Currently no fever and her leg is improving not completely resolved  She did complete antibiotics yesterday

## 2021-04-14 ENCOUNTER — TELEPHONE (OUTPATIENT)
Dept: LAB | Facility: HOSPITAL | Age: 31
End: 2021-04-14

## 2021-04-15 ENCOUNTER — TELEMEDICINE (OUTPATIENT)
Dept: INTERNAL MEDICINE CLINIC | Facility: CLINIC | Age: 31
End: 2021-04-15
Payer: COMMERCIAL

## 2021-04-15 DIAGNOSIS — Z13.29 SCREENING FOR THYROID DISORDER: ICD-10-CM

## 2021-04-15 DIAGNOSIS — L03.116 CELLULITIS OF LEFT LOWER EXTREMITY: Primary | ICD-10-CM

## 2021-04-15 DIAGNOSIS — N92.6 IRREGULAR MENSTRUAL CYCLE: ICD-10-CM

## 2021-04-15 DIAGNOSIS — E11.9 DIABETES (HCC): ICD-10-CM

## 2021-04-15 DIAGNOSIS — D50.9 IRON DEFICIENCY ANEMIA: ICD-10-CM

## 2021-04-15 DIAGNOSIS — E66.01 MORBID OBESITY (HCC): ICD-10-CM

## 2021-04-15 PROCEDURE — 99244 OFF/OP CNSLTJ NEW/EST MOD 40: CPT | Performed by: INTERNAL MEDICINE

## 2021-04-15 RX ORDER — FERROUS SULFATE TAB EC 324 MG (65 MG FE EQUIVALENT) 324 (65 FE) MG
324 TABLET DELAYED RESPONSE ORAL EVERY OTHER DAY
Qty: 30 TABLET | Refills: 0 | Status: SHIPPED | OUTPATIENT
Start: 2021-04-15 | End: 2021-06-14

## 2021-04-15 RX ORDER — FUROSEMIDE 20 MG/1
TABLET ORAL
COMMUNITY
Start: 2021-01-14 | End: 2021-04-15

## 2021-04-15 NOTE — PROGRESS NOTES
Virtual Regular Visit      Assessment/Plan:    Problem List Items Addressed This Visit        Endocrine    Diabetes ([facility] 75 )     · Hemoglobin A1C on 04/02/2021 at 6 6  · Patient reports she eats twice a day and is including fruits and vegetables in her diet  She often skips breakfast   · Plan:  · Lifestyle modifications were advised including adjusting diet and adding exercise to her daily routine once the lower extremity cellulitis has improved  · Patient was advised to measure her blood glucose at home at least 3 times a week and to keep a log to review at her next visit  · Referral to Dietitian has been provided  · Glucometer, test strips and lancets have been prescribed  · Will repeat A1C in 3-6 months  Relevant Orders    Glucometer    Lancets    Glucometer test strips    Ambulatory referral to Nutrition Services    Lipid Panel with Direct LDL reflex       Other    Irregular menstrual cycle     · Menarche at approximately 14years old  · Menstrual cycles are very irregular lasting from 1 week to more than a month  · She reports being diagnosed with PCOS in the past  She was treated with Progesterone-only birth control pills with no improvement  · Will discuss further at next visit in the office  · Patient could benefit from Metformin for both PCOS and diabetes  Morbid obesity ([facility] )     · BMI 89 27  · Lifestyle modifications have been advised to patient  · Nutrition consult was provided  Relevant Orders    Ambulatory referral to Nutrition Services    Lipid Panel with Direct LDL reflex    Cellulitis - Primary     · Patient admitted at [facility] from 04/02 to 04/04 due to LLE cellulitis  She received treatment with Cefazolin IV as inpatient and then transitioned to Keflex 750 mg q6h for 7 days as well as lasix for 7 days  · Patient has completed antibiotic treatment as well as diuretics  · LLE cellulitis is improving  · Referral to wound care was provided    · Will follow up in 1 week in the office  Relevant Orders    Ambulatory referral to Wound Care    Iron deficiency anemia     · Hemoglobin 10  MCV 79  · Iron Panel suggestive of iron deficiency anemia  · Start Iron supplement every other day  · Side effects of iron supplement have been discussed  Relevant Medications    ferrous sulfate 324 (65 Fe) mg    Other Relevant Orders    CBC and differential      Other Visit Diagnoses     Screening for thyroid disorder        Relevant Orders    TSH+Free T4          BMI Counseling: There is no height or weight on file to calculate BMI  The BMI is above normal  Nutrition recommendations include decreasing portion sizes, encouraging healthy choices of fruits and vegetables, decreasing fast food intake, consuming healthier snacks and moderation in carbohydrate intake  Exercise recommendations include exercising 3-5 times per week  No pharmacotherapy was ordered  Patient referred to nutritionist due to patient being overweight  Reason for visit is   Chief Complaint   Patient presents with    Virtual Regular Visit        Encounter provider Bernard Zhu MD    Provider located at [address] [place] [address]  [phone]      Recent Visits  No visits were found meeting these conditions  Showing recent visits within past 7 days and meeting all other requirements     Today's Visits  Date Type Provider Dept   04/15/21 Elana Haro MD [facility] [place]   Showing today's visits and meeting all other requirements     Future Appointments  No visits were found meeting these conditions  Showing future appointments within next 150 days and meeting all other requirements        The patient was identified by name and date of birth   Yuliana Fruit was informed that this is a telemedicine visit and that the visit is being conducted through [address] [place] [address] Now and patient was informed that this is a secure, HIPAA-compliant platform  She agrees to proceed     My office door was closed  The patient was notified the following individuals were present in the room Dr Musa Cole (attending physician)  She acknowledged consent and understanding of privacy and security of the video platform  The patient has agreed to participate and understands they can discontinue the visit at any time  Patient is aware this is a billable service  Subjective  Yuliana Lawson is a 35 y o  female   Patient is a 35year old female with BMI 89 27, who was seen via telemedicine to establish care with a primary care physician  Patient was recently admitted at [address] [place] [address] from 04/02-04/04 due to LLE cellulitis  She was treated with IV cefazolin and then transitioned to PO Keflex for 7 days as well as lasix for 7 days  During hospitalization, patient was diagnosed as diabetic with a hemoglobin A1C of 6 6  She was discharged home and she completed antibiotic therapy on Friday 04/09  Today, Patient states she feels better and has noticed significant improvement of her LLE  She still has an area with a scab that is bothering her and is painful if she wears long pants  Otherwise, she denies fever, chills, shortness of breath, nausea and vomiting  Additionally, patient reports she had COVID-19 infection in late February and has recovered well from it  She is hesitant about eventually getting the vaccine  She also reports a diagnosis of PCOS several years ago that was treated with progesterone-only OCPs which she states actually made it worse  She continues to have irregular menses  This will be discussed in next visit at the office         Past Medical History:   Diagnosis Date    Allergic rhinitis     Anemia     Arthritis     Asthma     seasonal    DUB (dysfunctional uterine bleeding)     Iliotibial band syndrome of left side     Obesity     PCOS (polycystic ovarian syndrome)     Ruptured lumbar disc Past Surgical History:   Procedure Laterality Date    NO PAST SURGERIES      DC HYSTEROSCOPY,W/ENDO BX N/A 1/15/2018    Procedure: DILATATION AND CURETTAGE (D&C) WITH HYSTEROSCOPY;  Surgeon: Clint Hawkins MD;  Location: [facility];  Service: Gynecology       Current Outpatient Medications   Medication Sig Dispense Refill    ferrous sulfate 324 (65 Fe) mg Take 1 tablet (324 mg total) by mouth every other day 30 tablet 0     No current facility-administered medications for this visit  Allergies   Allergen Reactions    Oxycodone Itching       Review of Systems   Constitutional: Negative for chills and fever  HENT: Negative  Eyes: Negative  Respiratory: Negative for cough, shortness of breath and wheezing  Cardiovascular: Negative for chest pain and palpitations  Gastrointestinal: Negative for abdominal pain, constipation, diarrhea, nausea and vomiting  Genitourinary: Negative  Neurological: Negative for dizziness and light-headedness  Hematological: Negative  Psychiatric/Behavioral: Negative  Video Exam    There were no vitals filed for this visit  Physical Exam  Constitutional:       General: She is not in acute distress  Appearance: She is obese  She is not ill-appearing or toxic-appearing  HENT:      Head: Normocephalic  Eyes:      General: No scleral icterus  Neck:      Musculoskeletal: Normal range of motion  Pulmonary:      Comments: Patient can speak in full sentences  She is not in respiratory distress  Skin:     Comments: Area of erythema in LLE  Improving, per patient report  Neurological:      Mental Status: She is alert and oriented to person, place, and time  Psychiatric:         Mood and Affect: Mood normal          Behavior: Behavior normal          Thought Content:  Thought content normal          Judgment: Judgment normal           I spent 30 minutes directly with the patient during this visit      VIRTUAL VISIT DISCLAIMER    Kyleashvin Adinjessa acknowledges that she has consented to an online visit or consultation  She understands that the online visit is based solely on information provided by her, and that, in the absence of a face-to-face physical evaluation by the physician, the diagnosis she receives is both limited and provisional in terms of accuracy and completeness  This is not intended to replace a full medical face-to-face evaluation by the physician  Simone Landa understands and accepts these terms

## 2021-04-15 NOTE — ASSESSMENT & PLAN NOTE
· BMI 89 27  · Lifestyle modifications have been advised to patient  · Nutrition consult was provided

## 2021-04-15 NOTE — ASSESSMENT & PLAN NOTE
· Patient admitted at [facility] from 04/02 to 04/04 due to LLE cellulitis  She received treatment with Cefazolin IV as inpatient and then transitioned to Keflex 750 mg q6h for 7 days as well as lasix for 7 days  · Patient has completed antibiotic treatment as well as diuretics  · LLE cellulitis is improving  · Referral to wound care was provided  · Will follow up in 1 week in the office

## 2021-04-15 NOTE — ASSESSMENT & PLAN NOTE
· Hemoglobin 10  MCV 79  · Iron Panel suggestive of iron deficiency anemia  · Start Iron supplement every other day  · Side effects of iron supplement have been discussed

## 2021-04-15 NOTE — ASSESSMENT & PLAN NOTE
· Hemoglobin A1C on 04/02/2021 at 6 6  · Patient reports she eats twice a day and is including fruits and vegetables in her diet  She often skips breakfast   · Plan:  · Lifestyle modifications were advised including adjusting diet and adding exercise to her daily routine once the lower extremity cellulitis has improved  · Patient was advised to measure her blood glucose at home at least 3 times a week and to keep a log to review at her next visit  · Referral to Dietitian has been provided  · Glucometer, test strips and lancets have been prescribed  · Will repeat A1C in 3-6 months

## 2021-04-15 NOTE — ASSESSMENT & PLAN NOTE
· Menarche at approximately 14years old  · Menstrual cycles are very irregular lasting from 1 week to more than a month  · She reports being diagnosed with PCOS in the past  She was treated with Progesterone-only birth control pills with no improvement  · Will discuss further at next visit in the office  · Patient could benefit from Metformin for both PCOS and diabetes

## 2021-04-20 DIAGNOSIS — E13.69 OTHER SPECIFIED DIABETES MELLITUS WITH OTHER SPECIFIED COMPLICATION, UNSPECIFIED WHETHER LONG TERM INSULIN USE (HCC): Primary | ICD-10-CM

## 2021-04-21 DIAGNOSIS — E13.69 OTHER SPECIFIED DIABETES MELLITUS WITH OTHER SPECIFIED COMPLICATION, UNSPECIFIED WHETHER LONG TERM INSULIN USE (HCC): Primary | ICD-10-CM

## 2021-04-21 RX ORDER — LANCETS 28 GAUGE
EACH MISCELLANEOUS 3 TIMES DAILY
Qty: 100 EACH | Refills: 5 | Status: SHIPPED | OUTPATIENT
Start: 2021-04-21

## 2021-04-21 RX ORDER — BLOOD SUGAR DIAGNOSTIC
STRIP MISCELLANEOUS
Qty: 100 EACH | Refills: 5 | Status: SHIPPED | OUTPATIENT
Start: 2021-04-21 | End: 2021-04-21

## 2021-04-21 RX ORDER — BLOOD-GLUCOSE METER
EACH MISCELLANEOUS AS NEEDED
Qty: 1 KIT | Refills: 0 | Status: SHIPPED | OUTPATIENT
Start: 2021-04-21 | End: 2021-04-21

## 2021-04-21 RX ORDER — BLOOD SUGAR DIAGNOSTIC
1 STRIP MISCELLANEOUS 3 TIMES DAILY
Qty: 100 EACH | Refills: 5 | Status: SHIPPED | OUTPATIENT
Start: 2021-04-21

## 2021-04-21 RX ORDER — BLOOD-GLUCOSE METER
EACH MISCELLANEOUS
Qty: 1 KIT | Refills: 0 | Status: SHIPPED | OUTPATIENT
Start: 2021-04-21

## 2021-04-21 RX ORDER — BLOOD-GLUCOSE METER
1 KIT MISCELLANEOUS 3 TIMES DAILY
Qty: 1 EACH | Refills: 0 | Status: SHIPPED | OUTPATIENT
Start: 2021-04-21 | End: 2021-04-21

## 2021-04-21 NOTE — TELEPHONE ENCOUNTER
Neither test strips were covered by insurance   Verified via [employer] web site that one touch verio is preferred

## 2021-04-21 NOTE — TELEPHONE ENCOUNTER
The freestyle glucometer and test strips were not covered by insurance   Will try accu chek sent to [facility]

## 2021-04-22 ENCOUNTER — APPOINTMENT (OUTPATIENT)
Dept: LAB | Facility: HOSPITAL | Age: 31
End: 2021-04-22
Payer: COMMERCIAL

## 2021-04-22 DIAGNOSIS — E11.9 DIABETES (HCC): ICD-10-CM

## 2021-04-22 DIAGNOSIS — E66.01 MORBID OBESITY (HCC): ICD-10-CM

## 2021-04-22 DIAGNOSIS — D50.9 IRON DEFICIENCY ANEMIA: ICD-10-CM

## 2021-04-22 DIAGNOSIS — I89.0 LYMPHEDEMA: ICD-10-CM

## 2021-04-22 PROBLEM — L03.116 LEFT LEG CELLULITIS: Status: ACTIVE | Noted: 2021-04-22

## 2021-04-22 LAB
ANION GAP SERPL CALCULATED.3IONS-SCNC: 7 MMOL/L (ref 4–13)
BASOPHILS # BLD AUTO: 0.02 THOUSANDS/ΜL (ref 0–0.1)
BASOPHILS NFR BLD AUTO: 0 % (ref 0–1)
BUN SERPL-MCNC: 7 MG/DL (ref 5–25)
CALCIUM SERPL-MCNC: 9.4 MG/DL (ref 8.3–10.1)
CHLORIDE SERPL-SCNC: 105 MMOL/L (ref 100–108)
CHOLEST SERPL-MCNC: 146 MG/DL (ref 50–200)
CO2 SERPL-SCNC: 25 MMOL/L (ref 21–32)
CREAT SERPL-MCNC: 0.55 MG/DL (ref 0.6–1.3)
EOSINOPHIL # BLD AUTO: 0.07 THOUSAND/ΜL (ref 0–0.61)
EOSINOPHIL NFR BLD AUTO: 1 % (ref 0–6)
ERYTHROCYTE [DISTWIDTH] IN BLOOD BY AUTOMATED COUNT: 17 % (ref 11.6–15.1)
GFR SERPL CREATININE-BSD FRML MDRD: 127 ML/MIN/1.73SQ M
GLUCOSE P FAST SERPL-MCNC: 131 MG/DL (ref 65–99)
HCT VFR BLD AUTO: 35 % (ref 34.8–46.1)
HDLC SERPL-MCNC: 31 MG/DL
HGB BLD-MCNC: 10 G/DL (ref 11.5–15.4)
IMM GRANULOCYTES # BLD AUTO: 0.11 THOUSAND/UL (ref 0–0.2)
IMM GRANULOCYTES NFR BLD AUTO: 1 % (ref 0–2)
LDLC SERPL CALC-MCNC: 75 MG/DL (ref 0–100)
LYMPHOCYTES # BLD AUTO: 2.08 THOUSANDS/ΜL (ref 0.6–4.47)
LYMPHOCYTES NFR BLD AUTO: 26 % (ref 14–44)
MCH RBC QN AUTO: 22.5 PG (ref 26.8–34.3)
MCHC RBC AUTO-ENTMCNC: 28.6 G/DL (ref 31.4–37.4)
MCV RBC AUTO: 79 FL (ref 82–98)
MONOCYTES # BLD AUTO: 0.41 THOUSAND/ΜL (ref 0.17–1.22)
MONOCYTES NFR BLD AUTO: 5 % (ref 4–12)
NEUTROPHILS # BLD AUTO: 5.38 THOUSANDS/ΜL (ref 1.85–7.62)
NEUTS SEG NFR BLD AUTO: 67 % (ref 43–75)
NRBC BLD AUTO-RTO: 0 /100 WBCS
PLATELET # BLD AUTO: 314 THOUSANDS/UL (ref 149–390)
PMV BLD AUTO: 9.9 FL (ref 8.9–12.7)
POTASSIUM SERPL-SCNC: 4.2 MMOL/L (ref 3.5–5.3)
RBC # BLD AUTO: 4.45 MILLION/UL (ref 3.81–5.12)
SODIUM SERPL-SCNC: 137 MMOL/L (ref 136–145)
T4 FREE SERPL-MCNC: 0.79 NG/DL (ref 0.76–1.46)
TRIGL SERPL-MCNC: 200 MG/DL
TSH SERPL DL<=0.05 MIU/L-ACNC: 1.86 UIU/ML (ref 0.36–3.74)
WBC # BLD AUTO: 8.07 THOUSAND/UL (ref 4.31–10.16)

## 2021-04-22 PROCEDURE — 85025 COMPLETE CBC W/AUTO DIFF WBC: CPT

## 2021-04-22 PROCEDURE — 84443 ASSAY THYROID STIM HORMONE: CPT

## 2021-04-22 PROCEDURE — 84439 ASSAY OF FREE THYROXINE: CPT

## 2021-04-22 PROCEDURE — 36415 COLL VENOUS BLD VENIPUNCTURE: CPT

## 2021-04-22 PROCEDURE — 80048 BASIC METABOLIC PNL TOTAL CA: CPT

## 2021-04-22 PROCEDURE — 80061 LIPID PANEL: CPT

## 2021-04-27 NOTE — UTILIZATION REVIEW
Notification of Discharge   This is a Notification of Discharge from our facility [address]  Please be advised that this patient has been discharge from our facility  Below you will find the admission and discharge date and time including the patients disposition  UTILIZATION REVIEW CONTACT:  Yordy Nagel  Utilization   Network Utilization Review Department  Phone: [phone] x carefully listen to the prompts  All voicemails are confidential   Email: [email or web address]     PHYSICIAN ADVISORY SERVICES:  FOR [id number] REVIEW - MEDICAL NECESSITY DENIAL  Phone: [phone]  Fax: [phone]  Email: [email or web address]     PRESENTATION DATE: 4/2/2021 10:18 PM  OBERVATION ADMISSION DATE:   INPATIENT ADMISSION DATE: 4/3/21 0045   DISCHARGE DATE: 4/4/2021  3:17 PM  DISPOSITION: Home/Self Care Home/Self Care      IMPORTANT INFORMATION:  Send all requests for admission clinical reviews, approved or denied determinations and any other requests to dedicated fax number below belonging to the campus where the patient is receiving treatment   List of dedicated fax numbers:  [address] DENIALS (Administrative/Medical Necessity) [phone]   1000  [facility] (Maternity/NICU/Pediatrics) [phone]   Logan Starr [phone]   [facility] [phone]   [facility] [phone]   Albina Mccall [address] [phone]   [facility]  [phone]   [address] [place] [address], [address]  [address] [facility] [address] [facility] [phone]

## 2021-06-13 DIAGNOSIS — D50.9 IRON DEFICIENCY ANEMIA: ICD-10-CM

## 2021-06-14 RX ORDER — FERROUS SULFATE TAB EC 324 MG (65 MG FE EQUIVALENT) 324 (65 FE) MG
TABLET DELAYED RESPONSE ORAL
Qty: 15 TABLET | Refills: 1 | Status: SHIPPED | OUTPATIENT
Start: 2021-06-14

## 2021-09-30 DIAGNOSIS — E13.69 OTHER SPECIFIED DIABETES MELLITUS WITH OTHER SPECIFIED COMPLICATION, UNSPECIFIED WHETHER LONG TERM INSULIN USE (HCC): ICD-10-CM

## 2021-09-30 RX ORDER — BLOOD SUGAR DIAGNOSTIC
STRIP MISCELLANEOUS
Qty: 100 STRIP | Refills: 5 | OUTPATIENT
Start: 2021-09-30

## 2021-09-30 NOTE — TELEPHONE ENCOUNTER
Gunner Andujar has requested a refill of one touch verio test strips  Nena Gillette is not a patient of the [address] [facility] [address]  Pt would need an appointment before refill can be done

## 2022-08-06 ENCOUNTER — HOSPITAL ENCOUNTER (INPATIENT)
Facility: HOSPITAL | Age: 32
LOS: 2 days | Discharge: HOME | DRG: 720 | End: 2022-08-08
Attending: EMERGENCY MEDICINE | Admitting: INTERNAL MEDICINE
Payer: COMMERCIAL

## 2022-08-06 ENCOUNTER — APPOINTMENT (EMERGENCY)
Dept: RADIOLOGY | Facility: HOSPITAL | Age: 32
DRG: 720 | End: 2022-08-06
Payer: COMMERCIAL

## 2022-08-06 DIAGNOSIS — L03.116 CELLULITIS OF LEFT LOWER EXTREMITY: ICD-10-CM

## 2022-08-06 DIAGNOSIS — I89.0 LYMPHEDEMA: ICD-10-CM

## 2022-08-06 DIAGNOSIS — L03.116 LEFT LEG CELLULITIS: ICD-10-CM

## 2022-08-06 DIAGNOSIS — M79.672 LEFT FOOT PAIN: ICD-10-CM

## 2022-08-06 DIAGNOSIS — E66.9 SUPER OBESITY: ICD-10-CM

## 2022-08-06 DIAGNOSIS — E66.01 MORBID OBESITY (HCC): ICD-10-CM

## 2022-08-06 DIAGNOSIS — E11.69 TYPE 2 DIABETES MELLITUS WITH OTHER SPECIFIED COMPLICATION, WITH LONG-TERM CURRENT USE OF INSULIN (HCC): ICD-10-CM

## 2022-08-06 DIAGNOSIS — Z79.4 TYPE 2 DIABETES MELLITUS WITH OTHER SPECIFIED COMPLICATION, WITH LONG-TERM CURRENT USE OF INSULIN (HCC): ICD-10-CM

## 2022-08-06 DIAGNOSIS — A41.9 SEPSIS, DUE TO UNSPECIFIED ORGANISM, UNSPECIFIED WHETHER ACUTE ORGAN DYSFUNCTION PRESENT (HCC): ICD-10-CM

## 2022-08-06 DIAGNOSIS — L03.116 CELLULITIS OF LEFT LEG: Primary | ICD-10-CM

## 2022-08-06 LAB
ALBUMIN SERPL BCP-MCNC: 3.8 G/DL (ref 3.5–5)
ALP SERPL-CCNC: 102 U/L (ref 34–104)
ALT SERPL W P-5'-P-CCNC: 44 U/L (ref 7–52)
ANION GAP SERPL CALCULATED.3IONS-SCNC: 10 MMOL/L (ref 4–13)
AST SERPL W P-5'-P-CCNC: 41 U/L (ref 13–39)
BASOPHILS # BLD AUTO: 0.04 THOUSANDS/ΜL (ref 0–0.1)
BASOPHILS NFR BLD AUTO: 0 % (ref 0–1)
BILIRUB SERPL-MCNC: 0.35 MG/DL (ref 0.2–1)
BUN SERPL-MCNC: 8 MG/DL (ref 5–25)
CALCIUM SERPL-MCNC: 9.4 MG/DL (ref 8.4–10.2)
CHLORIDE SERPL-SCNC: 100 MMOL/L (ref 96–108)
CO2 SERPL-SCNC: 24 MMOL/L (ref 21–32)
CREAT SERPL-MCNC: 0.48 MG/DL (ref 0.6–1.3)
CRP SERPL QL: 120.6 MG/L
EOSINOPHIL # BLD AUTO: 0.13 THOUSAND/ΜL (ref 0–0.61)
EOSINOPHIL NFR BLD AUTO: 1 % (ref 0–6)
ERYTHROCYTE [DISTWIDTH] IN BLOOD BY AUTOMATED COUNT: 16 % (ref 11.6–15.1)
ERYTHROCYTE [SEDIMENTATION RATE] IN BLOOD: 111 MM/HOUR (ref 0–19)
GFR SERPL CREATININE-BSD FRML MDRD: 132 ML/MIN/1.73SQ M
GLUCOSE SERPL-MCNC: 176 MG/DL (ref 65–140)
HCT VFR BLD AUTO: 40.3 % (ref 34.8–46.1)
HGB BLD-MCNC: 11.7 G/DL (ref 11.5–15.4)
IMM GRANULOCYTES # BLD AUTO: 0.15 THOUSAND/UL (ref 0–0.2)
IMM GRANULOCYTES NFR BLD AUTO: 1 % (ref 0–2)
LACTATE SERPL-SCNC: 2.1 MMOL/L (ref 0.5–2)
LACTATE SERPL-SCNC: 2.5 MMOL/L (ref 0.5–2)
LYMPHOCYTES # BLD AUTO: 2.69 THOUSANDS/ΜL (ref 0.6–4.47)
LYMPHOCYTES NFR BLD AUTO: 18 % (ref 14–44)
MCH RBC QN AUTO: 22.8 PG (ref 26.8–34.3)
MCHC RBC AUTO-ENTMCNC: 29 G/DL (ref 31.4–37.4)
MCV RBC AUTO: 79 FL (ref 82–98)
MONOCYTES # BLD AUTO: 0.91 THOUSAND/ΜL (ref 0.17–1.22)
MONOCYTES NFR BLD AUTO: 6 % (ref 4–12)
NEUTROPHILS # BLD AUTO: 11.2 THOUSANDS/ΜL (ref 1.85–7.62)
NEUTS SEG NFR BLD AUTO: 74 % (ref 43–75)
NRBC BLD AUTO-RTO: 0 /100 WBCS
PLATELET # BLD AUTO: 387 THOUSANDS/UL (ref 149–390)
PMV BLD AUTO: 9.6 FL (ref 8.9–12.7)
POTASSIUM SERPL-SCNC: 3.8 MMOL/L (ref 3.5–5.3)
PROT SERPL-MCNC: 8 G/DL (ref 6.4–8.4)
RBC # BLD AUTO: 5.13 MILLION/UL (ref 3.81–5.12)
SODIUM SERPL-SCNC: 134 MMOL/L (ref 135–147)
WBC # BLD AUTO: 15.12 THOUSAND/UL (ref 4.31–10.16)

## 2022-08-06 PROCEDURE — 99285 EMERGENCY DEPT VISIT HI MDM: CPT

## 2022-08-06 PROCEDURE — 36415 COLL VENOUS BLD VENIPUNCTURE: CPT | Performed by: PHYSICIAN ASSISTANT

## 2022-08-06 PROCEDURE — 83605 ASSAY OF LACTIC ACID: CPT | Performed by: INTERNAL MEDICINE

## 2022-08-06 PROCEDURE — 85025 COMPLETE CBC W/AUTO DIFF WBC: CPT | Performed by: PHYSICIAN ASSISTANT

## 2022-08-06 PROCEDURE — 85652 RBC SED RATE AUTOMATED: CPT | Performed by: PHYSICIAN ASSISTANT

## 2022-08-06 PROCEDURE — 84145 PROCALCITONIN (PCT): CPT | Performed by: NURSE PRACTITIONER

## 2022-08-06 PROCEDURE — 87040 BLOOD CULTURE FOR BACTERIA: CPT | Performed by: PHYSICIAN ASSISTANT

## 2022-08-06 PROCEDURE — 96374 THER/PROPH/DIAG INJ IV PUSH: CPT

## 2022-08-06 PROCEDURE — 84703 CHORIONIC GONADOTROPIN ASSAY: CPT | Performed by: PHYSICIAN ASSISTANT

## 2022-08-06 PROCEDURE — 83605 ASSAY OF LACTIC ACID: CPT | Performed by: PHYSICIAN ASSISTANT

## 2022-08-06 PROCEDURE — 99285 EMERGENCY DEPT VISIT HI MDM: CPT | Performed by: PHYSICIAN ASSISTANT

## 2022-08-06 PROCEDURE — 83036 HEMOGLOBIN GLYCOSYLATED A1C: CPT | Performed by: NURSE PRACTITIONER

## 2022-08-06 PROCEDURE — 86140 C-REACTIVE PROTEIN: CPT | Performed by: PHYSICIAN ASSISTANT

## 2022-08-06 PROCEDURE — 99223 1ST HOSP IP/OBS HIGH 75: CPT | Performed by: NURSE PRACTITIONER

## 2022-08-06 PROCEDURE — 73590 X-RAY EXAM OF LOWER LEG: CPT

## 2022-08-06 PROCEDURE — 80053 COMPREHEN METABOLIC PANEL: CPT | Performed by: PHYSICIAN ASSISTANT

## 2022-08-06 RX ORDER — SEMAGLUTIDE 1.34 MG/ML
1 INJECTION, SOLUTION SUBCUTANEOUS WEEKLY
COMMUNITY
Start: 2022-02-24

## 2022-08-06 RX ORDER — CEFAZOLIN SODIUM 2 G/50ML
2000 SOLUTION INTRAVENOUS EVERY 8 HOURS
Status: DISCONTINUED | OUTPATIENT
Start: 2022-08-07 | End: 2022-08-08

## 2022-08-06 RX ORDER — CEFTRIAXONE 1 G/50ML
1000 INJECTION, SOLUTION INTRAVENOUS ONCE
Status: COMPLETED | OUTPATIENT
Start: 2022-08-06 | End: 2022-08-07

## 2022-08-06 RX ORDER — INSULIN GLARGINE 100 [IU]/ML
INJECTION, SOLUTION SUBCUTANEOUS
COMMUNITY
Start: 2022-03-09

## 2022-08-06 RX ORDER — CITALOPRAM 20 MG/1
20 TABLET ORAL DAILY
COMMUNITY
Start: 2022-05-02 | End: 2023-05-02

## 2022-08-06 RX ORDER — CEFAZOLIN SODIUM 2 G/50ML
2000 SOLUTION INTRAVENOUS EVERY 8 HOURS
Status: DISCONTINUED | OUTPATIENT
Start: 2022-08-06 | End: 2022-08-06

## 2022-08-06 RX ORDER — CEFTRIAXONE 1 G/50ML
1000 INJECTION, SOLUTION INTRAVENOUS ONCE
Status: COMPLETED | OUTPATIENT
Start: 2022-08-06 | End: 2022-08-06

## 2022-08-06 RX ADMIN — SODIUM CHLORIDE 1000 ML: 0.9 INJECTION, SOLUTION INTRAVENOUS at 22:56

## 2022-08-06 RX ADMIN — CEFTRIAXONE 1000 MG: 1 INJECTION, SOLUTION INTRAVENOUS at 22:21

## 2022-08-06 RX ADMIN — VANCOMYCIN HYDROCHLORIDE 2000 MG: 1 INJECTION, POWDER, LYOPHILIZED, FOR SOLUTION INTRAVENOUS at 22:55

## 2022-08-07 ENCOUNTER — APPOINTMENT (INPATIENT)
Dept: ULTRASOUND IMAGING | Facility: HOSPITAL | Age: 32
DRG: 720 | End: 2022-08-07
Payer: COMMERCIAL

## 2022-08-07 LAB
ALBUMIN SERPL BCP-MCNC: 3.2 G/DL (ref 3.5–5)
ALP SERPL-CCNC: 81 U/L (ref 34–104)
ALT SERPL W P-5'-P-CCNC: 34 U/L (ref 7–52)
ANION GAP SERPL CALCULATED.3IONS-SCNC: 6 MMOL/L (ref 4–13)
APTT PPP: 30 SECONDS (ref 23–37)
AST SERPL W P-5'-P-CCNC: 32 U/L (ref 13–39)
BASOPHILS # BLD AUTO: 0.04 THOUSANDS/ΜL (ref 0–0.1)
BASOPHILS NFR BLD AUTO: 0 % (ref 0–1)
BILIRUB SERPL-MCNC: 0.33 MG/DL (ref 0.2–1)
BUN SERPL-MCNC: 6 MG/DL (ref 5–25)
CALCIUM ALBUM COR SERPL-MCNC: 9.3 MG/DL (ref 8.3–10.1)
CALCIUM SERPL-MCNC: 8.7 MG/DL (ref 8.4–10.2)
CHLORIDE SERPL-SCNC: 104 MMOL/L (ref 96–108)
CO2 SERPL-SCNC: 27 MMOL/L (ref 21–32)
CREAT SERPL-MCNC: 0.48 MG/DL (ref 0.6–1.3)
D DIMER PPP FEU-MCNC: 0.36 UG/ML FEU
EOSINOPHIL # BLD AUTO: 0.09 THOUSAND/ΜL (ref 0–0.61)
EOSINOPHIL NFR BLD AUTO: 1 % (ref 0–6)
ERYTHROCYTE [DISTWIDTH] IN BLOOD BY AUTOMATED COUNT: 16.1 % (ref 11.6–15.1)
EST. AVERAGE GLUCOSE BLD GHB EST-MCNC: 174 MG/DL
GFR SERPL CREATININE-BSD FRML MDRD: 132 ML/MIN/1.73SQ M
GLUCOSE SERPL-MCNC: 147 MG/DL (ref 65–140)
GLUCOSE SERPL-MCNC: 147 MG/DL (ref 65–140)
GLUCOSE SERPL-MCNC: 153 MG/DL (ref 65–140)
GLUCOSE SERPL-MCNC: 155 MG/DL (ref 65–140)
GLUCOSE SERPL-MCNC: 161 MG/DL (ref 65–140)
GLUCOSE SERPL-MCNC: 173 MG/DL (ref 65–140)
HBA1C MFR BLD: 7.7 %
HCG SERPL QL: NEGATIVE
HCT VFR BLD AUTO: 35.8 % (ref 34.8–46.1)
HGB BLD-MCNC: 10.4 G/DL (ref 11.5–15.4)
IMM GRANULOCYTES # BLD AUTO: 0.1 THOUSAND/UL (ref 0–0.2)
IMM GRANULOCYTES NFR BLD AUTO: 1 % (ref 0–2)
INR PPP: 1.03 (ref 0.84–1.19)
LACTATE SERPL-SCNC: 1.8 MMOL/L (ref 0.5–2)
LACTATE SERPL-SCNC: 2.2 MMOL/L (ref 0.5–2)
LYMPHOCYTES # BLD AUTO: 2.51 THOUSANDS/ΜL (ref 0.6–4.47)
LYMPHOCYTES NFR BLD AUTO: 24 % (ref 14–44)
MCH RBC QN AUTO: 23 PG (ref 26.8–34.3)
MCHC RBC AUTO-ENTMCNC: 29.1 G/DL (ref 31.4–37.4)
MCV RBC AUTO: 79 FL (ref 82–98)
MONOCYTES # BLD AUTO: 0.75 THOUSAND/ΜL (ref 0.17–1.22)
MONOCYTES NFR BLD AUTO: 7 % (ref 4–12)
NEUTROPHILS # BLD AUTO: 7 THOUSANDS/ΜL (ref 1.85–7.62)
NEUTS SEG NFR BLD AUTO: 67 % (ref 43–75)
NRBC BLD AUTO-RTO: 0 /100 WBCS
PLATELET # BLD AUTO: 296 THOUSANDS/UL (ref 149–390)
PMV BLD AUTO: 9.5 FL (ref 8.9–12.7)
POTASSIUM SERPL-SCNC: 3.7 MMOL/L (ref 3.5–5.3)
PROCALCITONIN SERPL-MCNC: 0.12 NG/ML
PROT SERPL-MCNC: 6.6 G/DL (ref 6.4–8.4)
PROTHROMBIN TIME: 13.8 SECONDS (ref 11.6–14.5)
RBC # BLD AUTO: 4.53 MILLION/UL (ref 3.81–5.12)
SODIUM SERPL-SCNC: 137 MMOL/L (ref 135–147)
WBC # BLD AUTO: 10.49 THOUSAND/UL (ref 4.31–10.16)

## 2022-08-07 PROCEDURE — 82948 REAGENT STRIP/BLOOD GLUCOSE: CPT

## 2022-08-07 PROCEDURE — 85730 THROMBOPLASTIN TIME PARTIAL: CPT | Performed by: NURSE PRACTITIONER

## 2022-08-07 PROCEDURE — 76882 US LMTD JT/FCL EVL NVASC XTR: CPT

## 2022-08-07 PROCEDURE — 85025 COMPLETE CBC W/AUTO DIFF WBC: CPT | Performed by: NURSE PRACTITIONER

## 2022-08-07 PROCEDURE — 85610 PROTHROMBIN TIME: CPT | Performed by: NURSE PRACTITIONER

## 2022-08-07 PROCEDURE — 80053 COMPREHEN METABOLIC PANEL: CPT | Performed by: NURSE PRACTITIONER

## 2022-08-07 PROCEDURE — 83605 ASSAY OF LACTIC ACID: CPT | Performed by: NURSE PRACTITIONER

## 2022-08-07 PROCEDURE — 85379 FIBRIN DEGRADATION QUANT: CPT | Performed by: STUDENT IN AN ORGANIZED HEALTH CARE EDUCATION/TRAINING PROGRAM

## 2022-08-07 RX ORDER — INSULIN LISPRO 100 [IU]/ML
1-5 INJECTION, SOLUTION INTRAVENOUS; SUBCUTANEOUS
Status: DISCONTINUED | OUTPATIENT
Start: 2022-08-07 | End: 2022-08-08 | Stop reason: HOSPADM

## 2022-08-07 RX ORDER — INSULIN LISPRO 100 [IU]/ML
4-20 INJECTION, SOLUTION INTRAVENOUS; SUBCUTANEOUS
Status: DISCONTINUED | OUTPATIENT
Start: 2022-08-07 | End: 2022-08-08 | Stop reason: HOSPADM

## 2022-08-07 RX ORDER — ACETAMINOPHEN 325 MG/1
650 TABLET ORAL EVERY 6 HOURS PRN
Status: DISCONTINUED | OUTPATIENT
Start: 2022-08-07 | End: 2022-08-08 | Stop reason: HOSPADM

## 2022-08-07 RX ORDER — CITALOPRAM 20 MG/1
20 TABLET ORAL DAILY
Status: DISCONTINUED | OUTPATIENT
Start: 2022-08-07 | End: 2022-08-08 | Stop reason: HOSPADM

## 2022-08-07 RX ORDER — FUROSEMIDE 10 MG/ML
40 INJECTION INTRAMUSCULAR; INTRAVENOUS ONCE
Status: COMPLETED | OUTPATIENT
Start: 2022-08-07 | End: 2022-08-07

## 2022-08-07 RX ORDER — HEPARIN SODIUM 5000 [USP'U]/ML
7500 INJECTION, SOLUTION INTRAVENOUS; SUBCUTANEOUS EVERY 8 HOURS SCHEDULED
Status: DISCONTINUED | OUTPATIENT
Start: 2022-08-07 | End: 2022-08-08 | Stop reason: HOSPADM

## 2022-08-07 RX ORDER — FERROUS SULFATE 325(65) MG
325 TABLET ORAL EVERY OTHER DAY
Status: DISCONTINUED | OUTPATIENT
Start: 2022-08-07 | End: 2022-08-08 | Stop reason: HOSPADM

## 2022-08-07 RX ORDER — SODIUM CHLORIDE, SODIUM LACTATE, POTASSIUM CHLORIDE, CALCIUM CHLORIDE 600; 310; 30; 20 MG/100ML; MG/100ML; MG/100ML; MG/100ML
75 INJECTION, SOLUTION INTRAVENOUS CONTINUOUS
Status: DISCONTINUED | OUTPATIENT
Start: 2022-08-07 | End: 2022-08-07

## 2022-08-07 RX ADMIN — FERROUS SULFATE TAB 325 MG (65 MG ELEMENTAL FE) 325 MG: 325 (65 FE) TAB at 08:05

## 2022-08-07 RX ADMIN — INSULIN LISPRO 4 UNITS: 100 INJECTION, SOLUTION INTRAVENOUS; SUBCUTANEOUS at 18:12

## 2022-08-07 RX ADMIN — INSULIN LISPRO 1 UNITS: 100 INJECTION, SOLUTION INTRAVENOUS; SUBCUTANEOUS at 22:27

## 2022-08-07 RX ADMIN — ACETAMINOPHEN 650 MG: 325 TABLET ORAL at 08:05

## 2022-08-07 RX ADMIN — VANCOMYCIN HYDROCHLORIDE 2000 MG: 1 INJECTION, POWDER, LYOPHILIZED, FOR SOLUTION INTRAVENOUS at 23:26

## 2022-08-07 RX ADMIN — INSULIN LISPRO 1 UNITS: 100 INJECTION, SOLUTION INTRAVENOUS; SUBCUTANEOUS at 02:03

## 2022-08-07 RX ADMIN — HEPARIN SODIUM 7500 UNITS: 5000 INJECTION INTRAVENOUS; SUBCUTANEOUS at 13:53

## 2022-08-07 RX ADMIN — CEFAZOLIN SODIUM 2000 MG: 2 SOLUTION INTRAVENOUS at 22:26

## 2022-08-07 RX ADMIN — INSULIN LISPRO 4 UNITS: 100 INJECTION, SOLUTION INTRAVENOUS; SUBCUTANEOUS at 11:57

## 2022-08-07 RX ADMIN — HEPARIN SODIUM 7500 UNITS: 5000 INJECTION INTRAVENOUS; SUBCUTANEOUS at 01:40

## 2022-08-07 RX ADMIN — HEPARIN SODIUM 7500 UNITS: 5000 INJECTION INTRAVENOUS; SUBCUTANEOUS at 22:26

## 2022-08-07 RX ADMIN — CITALOPRAM HYDROBROMIDE 20 MG: 20 TABLET ORAL at 08:05

## 2022-08-07 RX ADMIN — FUROSEMIDE 40 MG: 10 INJECTION, SOLUTION INTRAMUSCULAR; INTRAVENOUS at 08:05

## 2022-08-07 RX ADMIN — VANCOMYCIN HYDROCHLORIDE 2000 MG: 1 INJECTION, POWDER, LYOPHILIZED, FOR SOLUTION INTRAVENOUS at 11:57

## 2022-08-07 RX ADMIN — CEFTRIAXONE 1000 MG: 1 INJECTION, SOLUTION INTRAVENOUS at 01:40

## 2022-08-07 NOTE — ASSESSMENT & PLAN NOTE
Lab Results   Component Value Date    HGBA1C 7 7 (H) 08/06/2022       · Diagnosed with DM 4/2021   · Previously prescribed lantus and ozempic  Self discontinued due to diarrhea  · Agreeable to accucheck, SSI  Also agreeable to resuming lantus pending repeat a1c   · Will need glycemic control for wound healing     · Needs ongoing diabetes education, nutrition

## 2022-08-07 NOTE — ED PROVIDER NOTES
History  Chief Complaint   Patient presents with    Cellulitis     Left leg cellulitis - intermittent x1 month  Patient is a 60-year-old female presenting to the emergency room for evaluation  Patient states she has a history of cellulitis and is coming in with cellulitis to the posterior left lower leg  She states it has been going on for about a month  Patiently recently finished a course of outpatient Keflex without any relief  Patient has had to be admitted to this hospital previously for severe sepsis secondary to cellulitis  Patient denies any other symptoms  History provided by:  Patient   used: No        Prior to Admission Medications   Prescriptions Last Dose Informant Patient Reported? Taking?    Blood Glucose Monitoring Suppl (OneTouch Verio) w/Device KIT Not Taking at Unknown time  No No   Sig: Use 1 three times daily   Patient not taking: Reported on 2022   Insulin Glargine Solostar 100 UNIT/ML SOPN Not Taking at Unknown time  Yes No   Si units daily   Patient not taking: Reported on 2022   Lancets (freestyle) lancets Not Taking at Unknown time  No No   Sig: Use 3 (three) times a day Provide brand covered or least cost to pt   Patient not taking: Reported on 2022   citalopram (CeleXA) 20 mg tablet   Yes Yes   Sig: Take 20 mg by mouth daily   ferrous sulfate 324 (65 Fe) mg   No Yes   Sig: TAKE 1 TABLET BY MOUTH EVERY OTHER DAY   glucose blood (OneTouch Verio) test strip Not Taking at Unknown time  No No   Sig: Use 1 each 3 (three) times a day   Patient not taking: Reported on 2022   semaglutide, 1 mg/dose, (Ozempic, 1 MG/DOSE,) 4 MG/3ML SOPN injection pen Not Taking at Unknown time  Yes No   Sig: Inject 1 mg under the skin Once a week   Patient not taking: Reported on 2022      Facility-Administered Medications: None       Past Medical History:   Diagnosis Date    Allergic rhinitis     Anemia     Arthritis     Asthma     seasonal    DUB (dysfunctional uterine bleeding)     Iliotibial band syndrome of left side     Obesity     PCOS (polycystic ovarian syndrome)     Ruptured lumbar disc        Past Surgical History:   Procedure Laterality Date    NO PAST SURGERIES      MO HYSTEROSCOPY,W/ENDO BX N/A 1/15/2018    Procedure: DILATATION AND CURETTAGE (D&C) WITH HYSTEROSCOPY;  Surgeon: Dario Chaudhary MD;  Location: [facility];  Service: Gynecology       Family History   Problem Relation Age of Onset    Diabetes Mother     Skin cancer Mother     Diabetes Father      I have reviewed and agree with the history as documented  E-Cigarette/Vaping    E-Cigarette Use Never User      E-Cigarette/Vaping Substances     Social History     Tobacco Use    Smoking status: Never Smoker    Smokeless tobacco: Never Used   Vaping Use    Vaping Use: Never used   Substance Use Topics    Alcohol use: Yes     Comment: standard drink 2 x month    Drug use: No       Review of Systems   Constitutional: Negative for chills and fever  HENT: Negative for ear pain and sore throat  Eyes: Negative for pain and visual disturbance  Respiratory: Negative for cough and shortness of breath  Cardiovascular: Negative for chest pain and palpitations  Gastrointestinal: Negative for abdominal pain and vomiting  Genitourinary: Negative for dysuria and hematuria  Musculoskeletal: Negative for arthralgias and back pain  Skin: Positive for color change and rash  Neurological: Negative for seizures and syncope  All other systems reviewed and are negative  Physical Exam  Physical Exam  Vitals and nursing note reviewed  Constitutional:       General: She is not in acute distress  Appearance: She is well-developed  HENT:      Head: Normocephalic and atraumatic  Eyes:      Conjunctiva/sclera: Conjunctivae normal    Cardiovascular:      Rate and Rhythm: Normal rate and regular rhythm  Heart sounds: No murmur heard    Pulmonary:      Effort: Pulmonary effort is normal  No respiratory distress  Breath sounds: Normal breath sounds  Abdominal:      Palpations: Abdomen is soft  Tenderness: There is no abdominal tenderness  Musculoskeletal:      Cervical back: Neck supple  Skin:     General: Skin is warm and dry  Comments: Erythema, warmth, bullae noted to left posterior calf  Areas of clear discharge noted  Area is severely indurated  No crepitus felt on dedicated crepitus exam   Neurological:      Mental Status: She is alert  Vital Signs  ED Triage Vitals   Temperature Pulse Respirations Blood Pressure SpO2   08/06/22 2013 08/06/22 2013 08/06/22 2013 08/06/22 2013 08/06/22 2013   98 7 °F (37 1 °C) (!) 143 20 (!) 173/97 96 %      Temp Source Heart Rate Source Patient Position - Orthostatic VS BP Location FiO2 (%)   08/06/22 2013 08/06/22 2013 08/06/22 2013 08/06/22 2013 --   Oral Monitor Sitting Left arm       Pain Score       08/06/22 2014       2           Vitals:    08/06/22 2013 08/06/22 2123   BP: (!) 173/97 137/83   Pulse: (!) 143 (!) 112   Patient Position - Orthostatic VS: Sitting          ED Medications  Medications   vancomycin (VANCOCIN) 2,000 mg in sodium chloride 0 9 % 500 mL IVPB (has no administration in time range)   cefTRIAXone (ROCEPHIN) IVPB (premix in dextrose) 1,000 mg 50 mL (1,000 mg Intravenous New Bag 8/6/22 2221)       Diagnostic Studies  Results Reviewed     Procedure Component Value Units Date/Time    Lactic acid, plasma [[id number]]  (Abnormal) Collected: 08/06/22 2110    Lab Status: Final result Specimen: Blood from Arm, Right Updated: 08/06/22 2231     LACTIC ACID 2 5 mmol/L     Narrative:      Result may be elevated if tourniquet was used during collection      Lactic acid 2 Hours [[id number]]     Lab Status: No result Specimen: Blood     Blood culture #1 [[id number]] Collected: 08/06/22 2220    Lab Status: No result Specimen: Blood from Arm, Left     Sedimentation rate, automated [[id number]] (Abnormal) Collected: 08/06/22 2110    Lab Status: Final result Specimen: Blood from Arm, Right Updated: 08/06/22 2148     Sed Rate 111 mm/hour     Comprehensive metabolic panel [[id number]]  (Abnormal) Collected: 08/06/22 2110    Lab Status: Final result Specimen: Blood from Arm, Right Updated: 08/06/22 2142     Sodium 134 mmol/L      Potassium 3 8 mmol/L      Chloride 100 mmol/L      CO2 24 mmol/L      ANION GAP 10 mmol/L      BUN 8 mg/dL      Creatinine 0 48 mg/dL      Glucose 176 mg/dL      Calcium 9 4 mg/dL      AST 41 U/L      ALT 44 U/L      Alkaline Phosphatase 102 U/L      Total Protein 8 0 g/dL      Albumin 3 8 g/dL      Total Bilirubin 0 35 mg/dL      eGFR 132 ml/min/1 73sq m     Narrative:      [place] guidelines for Chronic Kidney Disease (CKD):     Stage 1 with normal or high GFR (GFR > 90 mL/min/1 73 square meters)    Stage 2 Mild CKD (GFR = 60-89 mL/min/1 73 square meters)    Stage 3A Moderate CKD (GFR = 45-59 mL/min/1 73 square meters)    Stage 3B Moderate CKD (GFR = 30-44 mL/min/1 73 square meters)    Stage 4 Severe CKD (GFR = 15-29 mL/min/1 73 square meters)    Stage 5 End Stage CKD (GFR <15 mL/min/1 73 square meters)  Note: GFR calculation is accurate only with a steady state creatinine    C-reactive protein [[id number]]  (Abnormal) Collected: 08/06/22 2110    Lab Status: Final result Specimen: Blood from Arm, Right Updated: 08/06/22 2142      6 mg/L     POCT pregnancy, urine [[id number]]     Lab Status: No result     hCG, qualitative pregnancy [[id number]]     Lab Status: No result Specimen: Blood     CBC and differential [[id number]]  (Abnormal) Collected: 08/06/22 2110    Lab Status: Final result Specimen: Blood from Arm, Right Updated: 08/06/22 2120     WBC 15 12 Thousand/uL      RBC 5 13 Million/uL      Hemoglobin 11 7 g/dL      Hematocrit 40 3 %      MCV 79 fL      MCH 22 8 pg      MCHC 29 0 g/dL      RDW 16 0 %      MPV 9 6 fL      Platelets 239 Thousands/uL      nRBC 0 /100 WBCs      Neutrophils Relative 74 %      Immat GRANS % 1 %      Lymphocytes Relative 18 %      Monocytes Relative 6 %      Eosinophils Relative 1 %      Basophils Relative 0 %      Neutrophils Absolute 11 20 Thousands/µL      Immature Grans Absolute 0 15 Thousand/uL      Lymphocytes Absolute 2 69 Thousands/µL      Monocytes Absolute 0 91 Thousand/µL      Eosinophils Absolute 0 13 Thousand/µL      Basophils Absolute 0 04 Thousands/µL     Blood culture #2 [[id number]] Collected: 08/06/22 2110    Lab Status: In process Specimen: Blood from Arm, Right Updated: 08/06/22 2116                 XR tibia fibula 2 views LEFT    (Results Pending)                   Initial Sepsis Screening     Row Name 08/06/22 2233 08/06/22 2226             Is the patient's history suggestive of a new or worsening infection? Yes (Proceed)  -MW --  -MW       Suspected source of infection soft tissue  -MW --  -MW       Are two or more of the following signs & symptoms of infection both present and new to the patient? -- --  -MW       Indicate SIRS criteria Tachycardia > 90 bpm;Leukocytosis (WBC > 17194 IJL)  -MW --  -MW       If the answer is yes to both questions, suspicion of sepsis is present -- --       If severe sepsis is present AND tissue hypoperfusion perists in the hour after fluid resuscitation or lactate > 4, the patient meets criteria for SEPTIC SHOCK -- --       Are any of the following organ dysfunction criteria present within 6 hours of suspected infection and SIRS criteria that are NOT considered to be chronic conditions?  Yes  -MW --  -MW       Organ dysfunction Lactate > 2 0 mmol/L  -MW --       Date of presentation of severe sepsis 08/06/22  -MW --       Time of presentation of severe sepsis -- --       Tissue hypoperfusion persists in the hour after crystalloid fluid administration, evidenced, by either: -- --       Was hypotension present within one hour of the conclusion of crystalloid fluid administration? No  -MW --       Date of presentation of septic shock -- --       Time of presentation of septic shock -- --             User Key  (r) = Recorded By, (t) = Taken By, (c) = Cosigned By    Initials Name Provider Type    [address] [place] [address] Street, PA-C Physician Assistant                  MDM  Number of Diagnoses or Management Options  Cellulitis of left leg: new and requires workup  Sepsis, due to unspecified organism, unspecified whether acute organ dysfunction present [facility]): new and requires workup     Amount and/or Complexity of Data Reviewed  Clinical lab tests: ordered and reviewed  Tests in the radiology section of CPT®: ordered and reviewed    Risk of Complications, Morbidity, and/or Mortality  Presenting problems: high  Diagnostic procedures: high  Management options: high    Patient Progress  Patient progress: stable      Disposition  Final diagnoses:   Cellulitis of left leg   Sepsis, due to unspecified organism, unspecified whether acute organ dysfunction present [facility])     Time reflects when diagnosis was documented in both MDM as applicable and the Disposition within this note     Time User Action Codes Description Comment    8/6/2022 10:23 PM Damien Lam [L03 116] Cellulitis of left leg     8/6/2022 10:23 PM Damien Tan Add [A41 9] Sepsis, due to unspecified organism, unspecified whether acute organ dysfunction present [facility])       ED Disposition     ED Disposition   Admit    Condition   Stable    Date/Time   Sat Aug 6, 2022 10:26 PM    Comment   Case was discussed with BLAKE and the patient's admission status was agreed to be Admission Status: inpatient status to the service of Dr Bret Espana   Follow-up Information    None         Patient's Medications   Discharge Prescriptions    No medications on file       No discharge procedures on file      PDMP Review     None          ED Provider  Electronically Signed by           Greyson Carson PA-C  08/06/22 5134

## 2022-08-07 NOTE — ASSESSMENT & PLAN NOTE
· Presents with left leg cellulitis ongoing since May 2022 (on and off since April 21)  Completed course of keflex in June with no improvement  Body habitus, DM, underlying chronic lymphedema contributing    · Plan:  · Take Doxycycline twice a day for 10 days  · Use topical gel  · Apply clean dressing  · Follow with ENDO for tight BS control  · Follow with Lymphedema clinic

## 2022-08-07 NOTE — ASSESSMENT & PLAN NOTE
· BMI 87 7  · Low calories/low Cho diet  · Lifestyle consult  · Bariatric surgery referral  · Follow up with PCP  · Follow with weight management

## 2022-08-07 NOTE — SEPSIS NOTE
Sepsis Note   Anjel Marin 28 y o  female MRN: [id number]  Unit/Bed#: FT 04 Encounter: [id number]       qSOFA     [address] Name 08/06/22 2123 08/06/22 2013             Altered mental status GCS < 15 -- --       Respiratory Rate > / =93 0 0       Systolic BP < / =379 0 0       Q Sofa Score 0 0                  Initial Sepsis Screening     Row Name 08/06/22 2233 08/06/22 2226             Is the patient's history suggestive of a new or worsening infection? Yes (Proceed)  -MW --  -MW       Suspected source of infection soft tissue  -MW --  -MW       Are two or more of the following signs & symptoms of infection both present and new to the patient? -- --  -MW       Indicate SIRS criteria Tachycardia > 90 bpm;Leukocytosis (WBC > 04775 IJL)  -MW --  -MW       If the answer is yes to both questions, suspicion of sepsis is present -- --       If severe sepsis is present AND tissue hypoperfusion perists in the hour after fluid resuscitation or lactate > 4, the patient meets criteria for SEPTIC SHOCK -- --       Are any of the following organ dysfunction criteria present within 6 hours of suspected infection and SIRS criteria that are NOT considered to be chronic conditions? Yes  -MW --  -MW       Organ dysfunction Lactate > 2 0 mmol/L  -MW --       Date of presentation of severe sepsis 08/06/22  -MW --       Time of presentation of severe sepsis -- --       Tissue hypoperfusion persists in the hour after crystalloid fluid administration, evidenced, by either: -- --       Was hypotension present within one hour of the conclusion of crystalloid fluid administration?  No  -MW --       Date of presentation of septic shock -- --       Time of presentation of septic shock -- --             User Key  (r) = Recorded By, (t) = Taken By, (c) = Cosigned By    [address] Name Provider Type    BERNARDO Stephens PA-C Physician Assistant

## 2022-08-07 NOTE — ASSESSMENT & PLAN NOTE
Lab Results   Component Value Date    HGBA1C 7 7 (H) 08/06/2022       · Diagnosed with DM 4/2021   · Previously prescribed lantus and ozempic  Self discontinued due to diarrhea  · Agreeable to accucheck, SSI  Also agreeable to resuming lantus pending repeat a1c   · Will need glycemic control for wound healing     · Needs ongoing diabetes education, nutrition  · Follow with endocrinology in outpatient

## 2022-08-07 NOTE — ASSESSMENT & PLAN NOTE
· POA with leukocytosis, tachycardia  · Lactate 2 5  Trend until <2  · Stat coags   · Antibiotics: rocephin 1gram, vanco in ED    · Dose with another 1 gram rocephin due to weight >100 kg then continue with ancef only 2 gram q8 hours  · IV fluids

## 2022-08-07 NOTE — ASSESSMENT & PLAN NOTE
· Presents with left leg cellulitis ongoing since May 2022  Completed course of keflex in June with no improvement  Body habitus, DM, underlying chronic lymphedema contributing  · , sed rate 111  · xrays pending but no obvious gas noted  · Abx: rocephin, vanco in ED  · Continue with ancef  · Serial exams  · May trial compression stockings  · Trial dose lasix in AM   · Check dimer    Consider duplex if elevated

## 2022-08-07 NOTE — PROGRESS NOTES
Vancomycin Assessment    Kel Ellison is a 28 y o  female who is currently receiving vancomycin 2000 mg every 12 hours for skin-soft tissue infection     Relevant clinical data and objective history reviewed:  Creatinine   Date Value Ref Range Status   08/07/2022 0 48 (L) 0 60 - 1 30 mg/dL Final     Comment:     Standardized to IDMS reference method   08/06/2022 0 48 (L) 0 60 - 1 30 mg/dL Final     Comment:     Standardized to IDMS reference method   04/22/2021 0 55 (L) 0 60 - 1 30 mg/dL Final     Comment:     Standardized to IDMS reference method   07/02/2015 0 68 0 60 - 1 30 mg/dL Final     Comment:     Standardized to IDMS reference method     /82   Pulse (!) 109   Temp 97 9 °F (36 6 °C)   Resp 18   Ht 5' 7" (1 702 m)   Wt (!) 253 kg (558 lb)   SpO2 96%   BMI 87 40 kg/m²   I/O last 3 completed shifts: In: 48 [IV Piggyback:50]  Out: -   Lab Results   Component Value Date/Time    BUN 6 08/07/2022 05:22 AM    BUN 9 07/02/2015 04:42 PM    WBC 10 49 (H) 08/07/2022 05:22 AM    WBC 9 74 07/05/2015 11:21 AM    HGB 10 4 (L) 08/07/2022 05:22 AM    HGB 11 2 (L) 07/05/2015 11:21 AM    HCT 35 8 08/07/2022 05:22 AM    HCT 36 0 07/05/2015 11:21 AM    MCV 79 (L) 08/07/2022 05:22 AM    MCV 76 (L) 07/05/2015 11:21 AM     08/07/2022 05:22 AM     07/05/2015 11:21 AM     Temp Readings from Last 3 Encounters:   08/07/22 97 9 °F (36 6 °C)   04/04/21 97 9 °F (36 6 °C)   08/07/20 99 6 °F (37 6 °C) (Tympanic)     Vancomycin Days of Therapy: 2    Assessment/Plan  The patient is currently on vancomycin utilizing scheduled dosing  Baseline risks associated with therapy include: concomitant nephrotoxic medications  The patient is currently receiving 2000 mg every 12 hours and is clinically appropriate and dose will be continued  Pharmacy will also follow closely for s/sx of nephrotoxicity, infusion reactions, and appropriateness of therapy  BMP and CBC will be ordered per protocol    Plan for trough as patient approaches steady state, prior to the 4th  dose at approximately 1030 on 8/8  Due to infection severity, will target a trough of 10-15 (mild infection/cellulitis)   Pharmacy will continue to follow the patients culture results and clinical progress daily      Ganga Morales, Pharmacist

## 2022-08-07 NOTE — PLAN OF CARE
Problem: PAIN - ADULT  Goal: Verbalizes/displays adequate comfort level or baseline comfort level  Description: Interventions:  - Encourage patient to monitor pain and request assistance  - Assess pain using appropriate pain scale  - Administer analgesics based on type and severity of pain and evaluate response  - Implement non-pharmacological measures as appropriate and evaluate response  - Consider cultural and social influences on pain and pain management  - Notify physician/advanced practitioner if interventions unsuccessful or patient reports new pain  Outcome: Progressing     Problem: INFECTION - ADULT  Goal: Absence or prevention of progression during hospitalization  Description: INTERVENTIONS:  - Assess and monitor for signs and symptoms of infection  - Monitor lab/diagnostic results  - Monitor all insertion sites, i e  indwelling lines, tubes, and drains  - Monitor endotracheal if appropriate and nasal secretions for changes in amount and color  - Hilton appropriate cooling/warming therapies per order  - Administer medications as ordered  - Instruct and encourage patient and family to use good hand hygiene technique  - Identify and instruct in appropriate isolation precautions for identified infection/condition  Outcome: Progressing     Problem: SAFETY ADULT  Goal: Patient will remain free of falls  Description: INTERVENTIONS:  - Educate patient/family on patient safety including physical limitations  - Instruct patient to call for assistance with activity   - Consult OT/PT to assist with strengthening/mobility   - Keep Call bell within reach  - Keep bed low and locked with side rails adjusted as appropriate  - Keep care items and personal belongings within reach  - Initiate and maintain comfort rounds  - Make Fall Risk Sign visible to staff  - Offer Toileting every Hours, in advance of need  - Initiate/Maintain alarm  - Obtain necessary fall risk management equipment  - Apply yellow socks and bracelet for high fall risk patients  - Consider moving patient to room near nurses station  Outcome: Progressing     Problem: DISCHARGE PLANNING  Goal: Discharge to home or other facility with appropriate resources  Description: INTERVENTIONS:  - Identify barriers to discharge w/patient and caregiver  - Arrange for needed discharge resources and transportation as appropriate  - Identify discharge learning needs (meds, wound care, etc )  - Arrange for interpretive services to assist at discharge as needed  - Refer to Case Management Department for coordinating discharge planning if the patient needs post-hospital services based on physician/advanced practitioner order or complex needs related to functional status, cognitive ability, or social support system  Outcome: Progressing     Problem: Knowledge Deficit  Goal: Patient/family/caregiver demonstrates understanding of disease process, treatment plan, medications, and discharge instructions  Description: Complete learning assessment and assess knowledge base    Interventions:  - Provide teaching at level of understanding  - Provide teaching via preferred learning methods  Outcome: Progressing

## 2022-08-07 NOTE — UTILIZATION REVIEW
Initial Clinical Review    Admission: Date/Time/Statement:   Admission Orders (From admission, onward)     Ordered        08/06/22 2226  INPATIENT ADMISSION  Once                      Orders Placed This Encounter   Procedures    INPATIENT ADMISSION     Standing Status:   Standing     Number of Occurrences:   1     Order Specific Question:   Level of Care     Answer:   Med Surg [16]     Order Specific Question:   Estimated length of stay     Answer:   More than 2 Midnights     Order Specific Question:   Certification     Answer:   I certify that inpatient services are medically necessary for this patient for a duration of greater than two midnights  See H&P and MD Progress Notes for additional information about the patient's course of treatment  ED Arrival Information     Expected   -    Arrival   8/6/2022 19:42    Acuity   Urgent            Means of arrival   Walk-In    Escorted by   Self    Service   Hospitalist    Admission type   Urgent            Arrival complaint   Cellulitis in L Leg             Chief Complaint   Patient presents with    Cellulitis     Left leg cellulitis - intermittent x1 month  Initial Presentation: 28 y o  female presents self to ED as Inpatient due to Severe Sepsis, cellulitis of L Lower extremity, DM, morbid obesity  History of cellulitis to posterior L LE ongoing for 1 month; completed OP Keflex wo relief  EXAM  Are w erythema, bullae on L posterior calf  Purulent drainage  Tachycardia, Elevated BP; labs w leukocytosis, elevated lactic acid  PLAN IVF, stat coags, dose w another 1 GM Rocephin due to body WT> 100KG & cont w IV Ancef 2 GM Q8hr; serial exams; trial compression stockings, trial dose Lasix in AM, obtain D Dimer & duplex if elevated; needs ongoing diabetes education w nutrition ed  SSI          Date: 8/7/2022   Day 2:   Provider  Cellulitis L LE w ongoing since 5/2022 & on & OFF since 4/21 w OP antibx  Site draining   Bariatrics referral   ED Triage Vitals Temperature Pulse Respirations Blood Pressure SpO2   08/06/22 2013 08/06/22 2013 08/06/22 2013 08/06/22 2013 08/06/22 2013   98 7 °F (37 1 °C) (!) 143 20 (!) 173/97 96 %      Temp Source Heart Rate Source Patient Position - Orthostatic VS BP Location FiO2 (%)   08/06/22 2013 08/06/22 2013 08/06/22 2013 08/06/22 2013 --   Oral Monitor Sitting Left arm       Pain Score       08/06/22 2014       2          Wt Readings from Last 1 Encounters:   08/07/22 (!) 253 kg (558 lb)     Additional Vital Signs:   Date/Time Temp Pulse Resp BP MAP (mmHg) SpO2 O2 Device Patient Position - Orthostatic VS   08/07/22 14:21:22 98 6 °F (37 °C) 103 18 139/89 106 95 % -- --   08/07/22 07:06:19 97 9 °F (36 6 °C) 109 Abnormal  18 145/82 103 96 % -- --   08/07/22 01:14:35 97 8 °F (36 6 °C) 104 -- 133/75 94 98 % -- --   08/06/22 2321 -- 104 20 161/91 -- 100 % None (Room air) Sitting   08/06/22 2123 -- 112 Abnormal  20 137/83 104 99 % None (Room air) --   08/06/22 2013 98 7 °F (37 1 °C) 143 Abnormal  20 173/97 Abnormal  -- 96 % None (Room air) Sitting       Weights (last 14 days)    Date/Time Weight Weight Method Height   08/07/22 0600 253 kg (558 lb) Abnormal  Standing scale --   08/07/22 0021 255 kg (561 lb 9 6 oz) Abnormal  Standing scale --   08/06/22 2256 -- -- 5' 7" (1 702 m)   08/06/22 2151 254 kg (560 lb 6 6 oz) Abnormal  Bed scale --       Pertinent Labs/Diagnostic Test Results:   US extremity soft tissue   Final Result by Joe Keys MD (08/07 1550)      Diffuse edema and skin thickening suggestive of cellulitis  No discrete fluid collection demonstrated  XR tibia fibula 2 views LEFT   Final Result by Navi Colón MD (08/07 1020)   No radiographic evidence of osteomyelitis           Results from last 7 days   Lab Units 08/07/22  0522 08/06/22  2110   WBC Thousand/uL 10 49* 15 12*   HEMOGLOBIN g/dL 10 4* 11 7   HEMATOCRIT % 35 8 40 3   PLATELETS Thousands/uL 296 387   NEUTROS ABS Thousands/µL 7 00 11 20*         Results from last 7 days   Lab Units 08/07/22  0522 08/06/22  2110   SODIUM mmol/L 137 134*   POTASSIUM mmol/L 3 7 3 8   CHLORIDE mmol/L 104 100   CO2 mmol/L 27 24   ANION GAP mmol/L 6 10   BUN mg/dL 6 8   CREATININE mg/dL 0 48* 0 48*   EGFR ml/min/1 73sq m 132 132   CALCIUM mg/dL 8 7 9 4     Results from last 7 days   Lab Units 08/07/22  0522 08/06/22  2110   AST U/L 32 41*   ALT U/L 34 44   ALK PHOS U/L 81 102   TOTAL PROTEIN g/dL 6 6 8 0   ALBUMIN g/dL 3 2* 3 8   TOTAL BILIRUBIN mg/dL 0 33 0 35     Results from last 7 days   Lab Units 08/07/22  1550 08/07/22  1057 08/07/22  0704 08/07/22  0140   POC GLUCOSE mg/dl 153* 173* 147* 161*     Results from last 7 days   Lab Units 08/07/22  0522 08/06/22  2110   GLUCOSE RANDOM mg/dL 147* 176*         Results from last 7 days   Lab Units 08/06/22  2110   HEMOGLOBIN A1C % 7 7*   EAG mg/dl 174     No results found for: BETA-HYDROXYBUTYRATE                       Results from last 7 days   Lab Units 08/07/22  0136   D-DIMER QUANTITATIVE ug/ml FEU 0 36     Results from last 7 days   Lab Units 08/07/22  0136   PROTIME seconds 13 8   INR  1 03   PTT seconds 30         Results from last 7 days   Lab Units 08/06/22  2110   PROCALCITONIN ng/ml 0 12     Results from last 7 days   Lab Units 08/07/22  0522 08/07/22  0136 08/06/22  2319 08/06/22  2110   LACTIC ACID mmol/L 1 8 2 2* 2 1* 2 5*                             Results from last 7 days   Lab Units 08/06/22  2110   CRP mg/L 120 6*   SED RATE mm/hour 111*           Results from last 7 days   Lab Units 08/06/22  2220 08/06/22  2110   BLOOD CULTURE  Received in Microbiology Lab  Culture in Progress  Received in Microbiology Lab  Culture in Progress       No ekg available    ED Treatment:   Medication Administration from 08/06/2022 1941 to 08/07/2022 0048       Date/Time Order Dose Route Action     08/06/2022 2255 vancomycin (VANCOCIN) 2,000 mg in sodium chloride 0 9 % 500 mL IVPB 2,000 mg Intravenous New Bag     08/06/2022 2222 cefTRIAXone (ROCEPHIN) IVPB (premix in dextrose) 1,000 mg 50 mL 1,000 mg Intravenous New Bag     08/06/2022 4532 sodium chloride 0 9 % bolus 1,000 mL 1,000 mL Intravenous New Bag        Past Medical History:   Diagnosis Date    Allergic rhinitis     Anemia     Arthritis     Asthma     seasonal    DUB (dysfunctional uterine bleeding)     Iliotibial band syndrome of left side     Obesity     PCOS (polycystic ovarian syndrome)     Ruptured lumbar disc      Present on Admission:   Cellulitis of left lower extremity   Diabetes ([facility] [address] )   Morbid obesity (HCC)   Severe sepsis (HCC)      Admitting Diagnosis: Cellulitis [L03 90]  Cellulitis of left leg [L03 116]  Sepsis, due to unspecified organism, unspecified whether acute organ dysfunction present ([facility] ) [A41 9]  Age/Sex: 28 y o  female  Admission Orders:  Neurovascular checks  SCD    Scheduled Medications:  cefazolin, 2,000 mg, Intravenous, Q8H  citalopram, 20 mg, Oral, Daily  ferrous sulfate, 325 mg, Oral, Every Other Day  heparin (porcine), 7,500 Units, Subcutaneous, Q8H [address]  insulin lispro, 1-5 Units, Subcutaneous, HS  insulin lispro, 4-20 Units, Subcutaneous, TID AC  vancomycin, 2,000 mg, Intravenous, Q12H    Continuous IV Infusions:     PRN Meds:  acetaminophen, 650 mg, Oral, Q6H PRN        IP CONSULT TO PHARMACY  IP CONSULT TO NUTRITION SERVICES    Network Utilization Review Department  ATTENTION: Please call with any questions or concerns to [phone] and carefully listen to the prompts so that you are directed to the right person  All voicemails are confidential   Umair Vieira all requests for admission clinical reviews, approved or denied determinations and any other requests to dedicated fax number below belonging to the campus where the patient is receiving treatment   List of dedicated fax numbers for the Facilities:  FACILITY NAME UR FAX NUMBER   ADMISSION DENIALS (Administrative/Medical Necessity) [phone]   [address] (Maternity/NICU/Pediatrics) [address] [facility],[address]  [phone]   [place] [address] [place] [id number] [place] [address] [address] [place] [address] [phone]

## 2022-08-07 NOTE — PROGRESS NOTES
[facility]  Progress Note - Nirali Simpson 1990, 28 y o  female MRN: [id number]  Unit/Bed#: S -01 Encounter: [id number]  Primary Care Provider: No primary care provider on file  Date and time admitted to hospital: 8/6/2022  8:19 PM    * Cellulitis of left lower extremity  Assessment & Plan  · Presents with left leg cellulitis ongoing since May 2022 (on and off since April 21)  Completed course of keflex in June with no improvement  Body habitus, DM, underlying chronic lymphedema contributing  · , sed rate 111  · xrays report is pending but no obvious gas noted, no collection or bone involvement  · Abx: rocephin, vanco in ED  · Continue with Cefazoline 2g Q8  · Start Vancomycin due to possible resistance, pharm consult placed  · May trial compression stockings  · Trial dose lasix in AM   · Check dimer  Consider duplex if elevated    Severe sepsis [facility])  Assessment & Plan  POA with leukocytosis, tachycardia  BP is stable  Still tachy but improving since yesterday  Lactate 2 5 on admission  Trended down to 1 8  Stat coags wnl  Based on clinical presentation pt is not septic at this moment  Plan:  · Monitor vitals  · Follow Blood culture, follow MRSA culture  · Antibiotics: rocephin 2 gram, vanco in ED  Continue with ancef 2 gram q8 hours + Vanc    Diabetes [facility])  Assessment & Plan  Lab Results   Component Value Date    HGBA1C 7 7 (H) 08/06/2022       · Diagnosed with DM 4/2021   · Previously prescribed lantus and ozempic  Self discontinued due to diarrhea  · Agreeable to accucheck, SSI  Also agreeable to resuming lantus pending repeat a1c   · Will need glycemic control for wound healing     · Needs ongoing diabetes education, nutrition    Morbid obesity ([facility] [address] )  Assessment & Plan  · BMI 87 7  · Low calories/low Cho diet  · Lifestyle consult  · Bariatric surgery referral  · Follow up with PCP  · Random cortisol just to rule out Cushing syndrome      VTE Pharmacologic Prophylaxis: VTE Score: 5 High Risk (Score >/= 5) - Pharmacological DVT Prophylaxis Ordered: heparin  Sequential Compression Devices Ordered  Patient Centered Rounds: I performed bedside rounds with nursing staff today  Discussions with Specialists or Other Care Team Provider: attending, senior, nurse    Education and Discussions with Family / Patient: Patient declined call to   Current Length of Stay: 1 day(s)  Current Patient Status: Inpatient   Discharge Plan: Anticipate discharge in 24-48 hrs to home  Code Status: Level 1 - Full Code    Subjective:   No acute events overnight  Pt is not in acute distress  Pt states that she is feeling fine and didn't notice significant changes in her condition since yesterday  She is bothered by drainage from her left leg  Objective:     Vitals:   Temp (24hrs), Av 1 °F (36 7 °C), Min:97 8 °F (36 6 °C), Max:98 7 °F (37 1 °C)    Temp:  [97 8 °F (36 6 °C)-98 7 °F (37 1 °C)] 97 9 °F (36 6 °C)  HR:  [104-143] 109  Resp:  [18-20] 18  BP: (133-173)/(75-97) 145/82  SpO2:  [96 %-100 %] 96 %  Body mass index is 87 4 kg/m²  Input and Output Summary (last 24 hours): Intake/Output Summary (Last 24 hours) at 2022 1136  Last data filed at 2022 0805  Gross per 24 hour   Intake 290 ml   Output --   Net 290 ml       Physical Exam:   Physical Exam  Constitutional:       Appearance: Normal appearance  She is obese  HENT:      Head: Normocephalic and atraumatic  Nose: Nose normal       Mouth/Throat:      Mouth: Mucous membranes are moist    Eyes:      Extraocular Movements: Extraocular movements intact  Cardiovascular:      Rate and Rhythm: Regular rhythm  Tachycardia present  Pulses: Normal pulses  Heart sounds: Normal heart sounds  Pulmonary:      Effort: Pulmonary effort is normal       Breath sounds: Normal breath sounds  Abdominal:      General: Bowel sounds are normal       Palpations: Abdomen is soft     Musculoskeletal: Cervical back: Normal range of motion  Right lower leg: Edema present  Left lower leg: Edema present  Skin:     General: Skin is warm  Findings: Erythema and lesion present  Comments: Left lower extremity erythema, warm on touch with mild tenderness  Papular rash noticed on top of erythema (pt states that she has it for a year), non itchy  Oozing of clear liquid noticed on exam with several pustules on the posterior surface of LLE below the knee  No lesions on the feet were noticed  Neurological:      General: No focal deficit present  Mental Status: She is alert     Psychiatric:         Mood and Affect: Mood normal           Additional Data:     Labs:  Results from last 7 days   Lab Units 08/07/22  0522   WBC Thousand/uL 10 49*   HEMOGLOBIN g/dL 10 4*   HEMATOCRIT % 35 8   PLATELETS Thousands/uL 296   NEUTROS PCT % 67   LYMPHS PCT % 24   MONOS PCT % 7   EOS PCT % 1     Results from last 7 days   Lab Units 08/07/22  0522   SODIUM mmol/L 137   POTASSIUM mmol/L 3 7   CHLORIDE mmol/L 104   CO2 mmol/L 27   BUN mg/dL 6   CREATININE mg/dL 0 48*   ANION GAP mmol/L 6   CALCIUM mg/dL 8 7   ALBUMIN g/dL 3 2*   TOTAL BILIRUBIN mg/dL 0 33   ALK PHOS U/L 81   ALT U/L 34   AST U/L 32   GLUCOSE RANDOM mg/dL 147*     Results from last 7 days   Lab Units 08/07/22  0136   INR  1 03     Results from last 7 days   Lab Units 08/07/22  1057 08/07/22  0704 08/07/22  0140   POC GLUCOSE mg/dl 173* 147* 161*     Results from last 7 days   Lab Units 08/06/22  2110   HEMOGLOBIN A1C % 7 7*     Results from last 7 days   Lab Units 08/07/22  0522 08/07/22  0136 08/06/22  2319 08/06/22  2110   LACTIC ACID mmol/L 1 8 2 2* 2 1* 2 5*   PROCALCITONIN ng/ml  --   --   --  0 12       Lines/Drains:  Invasive Devices  Report    Peripheral Intravenous Line  Duration           Peripheral IV 08/06/22 Right Antecubital <1 day                      Imaging: Reviewed radiology reports from this admission including: xray(s)left lower tibula/fibula     Recent Cultures (last 7 days):   Results from last 7 days   Lab Units 08/06/22  2220 08/06/22  2110   BLOOD CULTURE  Received in Microbiology Lab  Culture in Progress  Received in Microbiology Lab  Culture in Progress  Last 24 Hours Medication List:   Current Facility-Administered Medications   Medication Dose Route Frequency Provider Last Rate    acetaminophen  650 mg Oral Q6H PRN Nuala Alen, CRNP      cefazolin  2,000 mg Intravenous Q8H Nulamberto Garrison, CRNP      citalopram  20 mg Oral Daily Nuala Garrison, CRNP      ferrous sulfate  325 mg Oral Every Other Day Nuala Garrison, CRNP      heparin (porcine)  7,500 Units Subcutaneous [facility] [place], [address]      insulin lispro  1-5 Units Subcutaneous HS Nuala Garrison, CRNP      insulin lispro  4-20 Units Subcutaneous TID [facility] Alen, CRNP      vancomycin  2,000 mg Intravenous Q12H David Diaz MD          Today, Patient Was Seen By: David Diaz MD    **Please Note: This note may have been constructed using a voice recognition system  **

## 2022-08-07 NOTE — ASSESSMENT & PLAN NOTE
POA with leukocytosis, tachycardia  BP is stable  Still tachy but improving since yesterday  Lactate 2 5 on admission  Trended down to 1 8  Stat coags wnl  Based on clinical presentation pt is not septic at this moment  Plan:  · Monitor vitals  · Follow Blood culture, follow MRSA culture  · Antibiotics: rocephin 2 gram, vanco in ED   Continue with ancef 2 gram q8 hours + Vanc

## 2022-08-07 NOTE — ASSESSMENT & PLAN NOTE
· Presents with left leg cellulitis ongoing since May 2022 (on and off since April 21)  Completed course of keflex in June with no improvement  Body habitus, DM, underlying chronic lymphedema contributing  · , sed rate 111  · xrays report is pending but no obvious gas noted, no collection or bone involvement  · Abx: rocephin, vanco in ED  · Continue with Cefazoline 2g Q8  · May trial compression stockings  · Trial dose lasix in AM   · Check dimer    Consider duplex if elevated

## 2022-08-07 NOTE — ASSESSMENT & PLAN NOTE
POA with leukocytosis, tachycardia  BP is stable  Still tachy but improving since yesterday  Lactate 2 5 on admission  Trended down to 1 8  Stat coags wnl  Based on clinical presentation pt is not septic at this moment  Plan:  · Monitor vitals  · Follow Blood culture, follow MRSA culture  · Antibiotics: rocephin 2 gram, vanco in ED   Continue with ancef only 2 gram q8 hours

## 2022-08-07 NOTE — ASSESSMENT & PLAN NOTE
Lab Results   Component Value Date    HGBA1C 6 6 (H) 03/11/2022       · Diagnosed with DM 4/2021   · Previously prescribed lantus and ozempic  Self discontinued due to diarrhea  · Agreeable to accucheck, SSI  Also agreeable to resuming lantus pending repeat a1c   · Will need glycemic control for wound healing     · Needs ongoing diabetes education, nutrition

## 2022-08-07 NOTE — H&P
[facility]  H&P- Miri Rodriguez 1990, 28 y o  female MRN: [id number]  Unit/Bed#: FT 04 Encounter: [id number]  Primary Care Provider: No primary care provider on file  Date and time admitted to hospital: 8/6/2022  8:19 PM    * Severe sepsis ([facility] )  Assessment & Plan  · POA with leukocytosis, tachycardia  · Lactate 2 5  Trend until <2  · Stat coags   · Antibiotics: rocephin 1gram, vanco in ED  · Dose with another 1 gram rocephin due to weight >100 kg then continue with ancef only 2 gram q8 hours  · IV fluids    Cellulitis of left lower extremity  Assessment & Plan  · Presents with left leg cellulitis ongoing since May 2022  Completed course of keflex in June with no improvement  Body habitus, DM, underlying chronic lymphedema contributing  · , sed rate 111  · xrays pending but no obvious gas noted  · Abx: rocephin, vanco in ED  · Continue with ancef  · Serial exams  · May trial compression stockings  · Trial dose lasix in AM   · Check dimer  Consider duplex if elevated    Diabetes [facility])  Assessment & Plan  Lab Results   Component Value Date    HGBA1C 6 6 (H) 03/11/2022       · Diagnosed with DM 4/2021   · Previously prescribed lantus and ozempic  Self discontinued due to diarrhea  · Agreeable to accucheck, SSI  Also agreeable to resuming lantus pending repeat a1c   · Will need glycemic control for wound healing  · Needs ongoing diabetes education, nutrition    Morbid obesity ([place] [address] )  Assessment & Plan  · BMI 87 7  · Follow up with PCP    VTE Pharmacologic Prophylaxis: VTE Score: 5 High Risk (Score >/= 5) - Pharmacological DVT Prophylaxis Ordered: heparin  Sequential Compression Devices Ordered  Code Status: Prior full  Discussion with family: Patient declined call to   Anticipated Length of Stay: Patient will be admitted on an inpatient basis with an anticipated length of stay of greater than 2 midnights secondary to sepsis, IV abx       Total Time for Visit, including Counseling / Coordination of Care: 70 minutes Greater than 50% of this total time spent on direct patient counseling and coordination of care  Chief Complaint: left leg cellulitis     History of Present Illness:  Zara Arce is a 28 y o  female with a PMH of DM, obesity, PCOS, asthma, arthritis who presents with left leg cellulitis  She reports this began around 5/2 but somewhat resolved on its own  On 6/21 she was given keflex 500 mg QID x 10 days for recurrence  She reports it never really got any better  Meets severe sepsis  Has mild discomfort to left lower extremity  Denies any other infectious symptoms  Patient is employed as a  but reports this doesn't require a lot of standing and is often able to elevate her leg  Admitted as inpatient  Note, will give another gram of rocephin to cover for weight  Then will continue with ancef only  Review of Systems:  Review of Systems   Cardiovascular: Positive for leg swelling  Skin: Positive for color change and wound  Psychiatric/Behavioral:        Anxiety resolved    All other systems reviewed and are negative  Past Medical and Surgical History:   Past Medical History:   Diagnosis Date    Allergic rhinitis     Anemia     Arthritis     Asthma     seasonal    DUB (dysfunctional uterine bleeding)     Iliotibial band syndrome of left side     Obesity     PCOS (polycystic ovarian syndrome)     Ruptured lumbar disc        Past Surgical History:   Procedure Laterality Date    NO PAST SURGERIES      CA HYSTEROSCOPY,W/ENDO BX N/A 1/15/2018    Procedure: DILATATION AND CURETTAGE (D&C) WITH HYSTEROSCOPY;  Surgeon: Cora Hamilton MD;  Location: [facility];  Service: Gynecology       Meds/Allergies:  Prior to Admission medications    Medication Sig Start Date End Date Taking?  Authorizing Provider   citalopram (CeleXA) 20 mg tablet Take 20 mg by mouth daily 5/2/22 5/2/23 Yes Historical Provider, MD ferrous sulfate 324 (65 Fe) mg TAKE 1 TABLET BY MOUTH EVERY OTHER DAY 6/14/21  Yes Luis Salazar MD   Blood Glucose Monitoring Suppl (OneTouch Verio) w/Device KIT Use 1 three times daily  Patient not taking: Reported on 8/6/2022 4/21/21   Marquis Mendoza MD   glucose blood (OneTouch Verio) test strip Use 1 each 3 (three) times a day  Patient not taking: Reported on 8/6/2022 4/21/21   Marquis Mendoza MD   Insulin Glargine Solostar 100 UNIT/ML SOPN 20 units daily  Patient not taking: Reported on 8/6/2022 3/9/22   Historical Provider, MD   Lancets (freestyle) lancets Use 3 (three) times a day Provide brand covered or least cost to pt  Patient not taking: Reported on 8/6/2022 4/21/21   Luis Salazar MD   semaglutide, 1 mg/dose, (Ozempic, 1 MG/DOSE,) 4 MG/3ML SOPN injection pen Inject 1 mg under the skin Once a week  Patient not taking: Reported on 8/6/2022 2/24/22   Historical Provider, MD     I have reviewed home medications with a medical source (PCP, Pharmacy, other)  Allergies:    Allergies   Allergen Reactions    Oxycodone Itching       Social History:  Marital Status: /Civil Union   Occupation:   Patient Pre-hospital Living Situation: Home  Patient Pre-hospital Level of Mobility: walks  Patient Pre-hospital Diet Restrictions:   Substance Use History:   Social History     Substance and Sexual Activity   Alcohol Use Yes    Comment: standard drink 2 x month     Social History     Tobacco Use   Smoking Status Never Smoker   Smokeless Tobacco Never Used     Social History     Substance and Sexual Activity   Drug Use No       Family History:  Family History   Problem Relation Age of Onset    Diabetes Mother     Skin cancer Mother     Diabetes Father        Physical Exam:     Vitals:   Blood Pressure: 161/91 (08/06/22 2321)  Pulse: 104 (08/06/22 2321)  Temperature: 98 7 °F (37 1 °C) (08/06/22 2013)  Temp Source: Oral (08/06/22 2013)  Respirations: 20 (08/06/22 2321)  Height: 5' 7" (170 2 cm) (08/06/22 9982)  Weight - Scale: (!) 254 kg (560 lb 6 6 oz) (08/06/22 2151)  SpO2: 100 % (08/06/22 2321)    Physical Exam  Constitutional:       General: She is not in acute distress  Appearance: Normal appearance  She is not ill-appearing  HENT:      Head: Normocephalic and atraumatic  Right Ear: External ear normal       Left Ear: External ear normal       Nose: Nose normal       Mouth/Throat:      Pharynx: Oropharynx is clear  Eyes:      General: No scleral icterus  Extraocular Movements: Extraocular movements intact  Conjunctiva/sclera: Conjunctivae normal    Cardiovascular:      Rate and Rhythm: Normal rate and regular rhythm  Pulses: Normal pulses  Heart sounds: Normal heart sounds  Pulmonary:      Effort: Pulmonary effort is normal       Breath sounds: Normal breath sounds  Abdominal:      General: Bowel sounds are normal       Palpations: Abdomen is soft  Musculoskeletal:         General: Tenderness (mildly tender LLE  no crepitus ) present  Normal range of motion  Cervical back: Normal range of motion  Left lower leg: Edema (chronic) present  Skin:     General: Skin is warm and dry  Capillary Refill: Capillary refill takes less than 2 seconds  Findings: Erythema present  Neurological:      General: No focal deficit present  Mental Status: She is alert and oriented to person, place, and time     Psychiatric:         Mood and Affect: Mood normal          Behavior: Behavior normal                   Additional Data:     Lab Results:  Results from last 7 days   Lab Units 08/06/22  2110   WBC Thousand/uL 15 12*   HEMOGLOBIN g/dL 11 7   HEMATOCRIT % 40 3   PLATELETS Thousands/uL 387   NEUTROS PCT % 74   LYMPHS PCT % 18   MONOS PCT % 6   EOS PCT % 1     Results from last 7 days   Lab Units 08/06/22  2110   SODIUM mmol/L 134*   POTASSIUM mmol/L 3 8   CHLORIDE mmol/L 100   CO2 mmol/L 24   BUN mg/dL 8   CREATININE mg/dL 0 48*   ANION GAP mmol/L 10   CALCIUM mg/dL 9  4   ALBUMIN g/dL 3 8   TOTAL BILIRUBIN mg/dL 0 35   ALK PHOS U/L 102   ALT U/L 44   AST U/L 41*   GLUCOSE RANDOM mg/dL 176*                 Results from last 7 days   Lab Units 08/06/22  2319 08/06/22  2110   LACTIC ACID mmol/L 2 1* 2 5*       Imaging: Personally reviewed the following imaging: xray(s)  XR tibia fibula 2 views LEFT    (Results Pending)       EKG and Other Studies Reviewed on Admission:   · EKG: No EKG obtained  ** Please Note: This note has been constructed using a voice recognition system   **

## 2022-08-08 VITALS
OXYGEN SATURATION: 95 % | HEART RATE: 99 BPM | RESPIRATION RATE: 18 BRPM | DIASTOLIC BLOOD PRESSURE: 88 MMHG | TEMPERATURE: 98 F | HEIGHT: 67 IN | SYSTOLIC BLOOD PRESSURE: 132 MMHG | WEIGHT: 293 LBS | BODY MASS INDEX: 45.99 KG/M2

## 2022-08-08 PROBLEM — R65.20 SEVERE SEPSIS (HCC): Status: RESOLVED | Noted: 2021-04-03 | Resolved: 2022-08-08

## 2022-08-08 PROBLEM — A41.9 SEVERE SEPSIS (HCC): Status: RESOLVED | Noted: 2021-04-03 | Resolved: 2022-08-08

## 2022-08-08 LAB
ANION GAP SERPL CALCULATED.3IONS-SCNC: 8 MMOL/L (ref 4–13)
BASOPHILS # BLD AUTO: 0.04 THOUSANDS/ΜL (ref 0–0.1)
BASOPHILS NFR BLD AUTO: 0 % (ref 0–1)
BUN SERPL-MCNC: 7 MG/DL (ref 5–25)
CALCIUM SERPL-MCNC: 9.1 MG/DL (ref 8.4–10.2)
CHLORIDE SERPL-SCNC: 101 MMOL/L (ref 96–108)
CO2 SERPL-SCNC: 28 MMOL/L (ref 21–32)
CORTIS SERPL-MCNC: 19.8 UG/DL
CREAT SERPL-MCNC: 0.53 MG/DL (ref 0.6–1.3)
EOSINOPHIL # BLD AUTO: 0.14 THOUSAND/ΜL (ref 0–0.61)
EOSINOPHIL NFR BLD AUTO: 2 % (ref 0–6)
ERYTHROCYTE [DISTWIDTH] IN BLOOD BY AUTOMATED COUNT: 16.1 % (ref 11.6–15.1)
GFR SERPL CREATININE-BSD FRML MDRD: 127 ML/MIN/1.73SQ M
GLUCOSE SERPL-MCNC: 162 MG/DL (ref 65–140)
GLUCOSE SERPL-MCNC: 168 MG/DL (ref 65–140)
GLUCOSE SERPL-MCNC: 173 MG/DL (ref 65–140)
HCT VFR BLD AUTO: 37.3 % (ref 34.8–46.1)
HGB BLD-MCNC: 11.3 G/DL (ref 11.5–15.4)
IMM GRANULOCYTES # BLD AUTO: 0.13 THOUSAND/UL (ref 0–0.2)
IMM GRANULOCYTES NFR BLD AUTO: 2 % (ref 0–2)
LYMPHOCYTES # BLD AUTO: 2.26 THOUSANDS/ΜL (ref 0.6–4.47)
LYMPHOCYTES NFR BLD AUTO: 25 % (ref 14–44)
MCH RBC QN AUTO: 23.9 PG (ref 26.8–34.3)
MCHC RBC AUTO-ENTMCNC: 30.3 G/DL (ref 31.4–37.4)
MCV RBC AUTO: 79 FL (ref 82–98)
MONOCYTES # BLD AUTO: 0.55 THOUSAND/ΜL (ref 0.17–1.22)
MONOCYTES NFR BLD AUTO: 6 % (ref 4–12)
NEUTROPHILS # BLD AUTO: 5.84 THOUSANDS/ΜL (ref 1.85–7.62)
NEUTS SEG NFR BLD AUTO: 65 % (ref 43–75)
NRBC BLD AUTO-RTO: 0 /100 WBCS
PLATELET # BLD AUTO: 319 THOUSANDS/UL (ref 149–390)
PMV BLD AUTO: 9.6 FL (ref 8.9–12.7)
POTASSIUM SERPL-SCNC: 3.8 MMOL/L (ref 3.5–5.3)
RBC # BLD AUTO: 4.72 MILLION/UL (ref 3.81–5.12)
SODIUM SERPL-SCNC: 137 MMOL/L (ref 135–147)
VANCOMYCIN TROUGH SERPL-MCNC: 6.3 UG/ML (ref 10–20)
WBC # BLD AUTO: 8.96 THOUSAND/UL (ref 4.31–10.16)

## 2022-08-08 PROCEDURE — 80202 ASSAY OF VANCOMYCIN: CPT | Performed by: STUDENT IN AN ORGANIZED HEALTH CARE EDUCATION/TRAINING PROGRAM

## 2022-08-08 PROCEDURE — 82533 TOTAL CORTISOL: CPT | Performed by: STUDENT IN AN ORGANIZED HEALTH CARE EDUCATION/TRAINING PROGRAM

## 2022-08-08 PROCEDURE — 82948 REAGENT STRIP/BLOOD GLUCOSE: CPT

## 2022-08-08 PROCEDURE — 85025 COMPLETE CBC W/AUTO DIFF WBC: CPT | Performed by: STUDENT IN AN ORGANIZED HEALTH CARE EDUCATION/TRAINING PROGRAM

## 2022-08-08 PROCEDURE — 99238 HOSP IP/OBS DSCHRG MGMT 30/<: CPT | Performed by: HOSPITALIST

## 2022-08-08 PROCEDURE — 80048 BASIC METABOLIC PNL TOTAL CA: CPT | Performed by: STUDENT IN AN ORGANIZED HEALTH CARE EDUCATION/TRAINING PROGRAM

## 2022-08-08 RX ORDER — CLINDAMYCIN PHOSPHATE AND BENZOYL PEROXIDE 10; 50 MG/G; MG/G
1 GEL TOPICAL DAILY
Qty: 45 G | Refills: 1 | Status: SHIPPED | OUTPATIENT
Start: 2022-08-08

## 2022-08-08 RX ORDER — DOXYCYCLINE 100 MG/1
100 TABLET ORAL 2 TIMES DAILY
Qty: 20 TABLET | Refills: 0 | Status: SHIPPED | OUTPATIENT
Start: 2022-08-08 | End: 2022-08-18

## 2022-08-08 RX ADMIN — CEFAZOLIN SODIUM 2000 MG: 2 SOLUTION INTRAVENOUS at 06:08

## 2022-08-08 RX ADMIN — INSULIN LISPRO 4 UNITS: 100 INJECTION, SOLUTION INTRAVENOUS; SUBCUTANEOUS at 11:04

## 2022-08-08 RX ADMIN — INSULIN LISPRO 4 UNITS: 100 INJECTION, SOLUTION INTRAVENOUS; SUBCUTANEOUS at 07:16

## 2022-08-08 RX ADMIN — VANCOMYCIN HYDROCHLORIDE 2000 MG: 1 INJECTION, POWDER, LYOPHILIZED, FOR SOLUTION INTRAVENOUS at 11:04

## 2022-08-08 RX ADMIN — CITALOPRAM HYDROBROMIDE 20 MG: 20 TABLET ORAL at 08:22

## 2022-08-08 RX ADMIN — ACETAMINOPHEN 650 MG: 325 TABLET ORAL at 02:11

## 2022-08-08 RX ADMIN — ACETAMINOPHEN 650 MG: 325 TABLET ORAL at 15:53

## 2022-08-08 RX ADMIN — HEPARIN SODIUM 7500 UNITS: 5000 INJECTION INTRAVENOUS; SUBCUTANEOUS at 06:08

## 2022-08-08 NOTE — CONSULTS
Consult Note - Wound   Sebastian Nice 28 y o  female MRN: [id number]  Unit/Bed#: S -01 Encounter: [id number]      Assessment:   Wound care nurse consult for wound on left lower extremity, posterior aspect  Patient admitted with left lower leg cellulitis  Preparing for discharge later today  Very friendly  Good historian  Patient is morbidly obese at 558 lb  Patient also recently  and let her car insurance lapse  She now has a suspended 's license and is forced to rely on others for transportation  In June, [facility] physician scheduled appointment with outpatient wound care center  However, patient said wound healed and she was told by primary care provider that there was no need to go to wound care  Patient is diabetic  Poorly controlled  Upon arrival, patient sitting in recliner chair  She was able to  order for me to see the wound on the left lower leg, posterior aspect, immediately above the ankle  Left lower leg with lymphedema  Only faint pink skin discoloration remains from cellulitis  There is some chronic skin changes present  Wound measures approximately 1 x 1 x 0 1 cm  Currently, dried blood on surface of wound  Periwound with lymphedema changes  Patient said that compression wrap was applied to left leg last night  When it was removed today, an indentation had occurred on the lower leg  There are no indentation marks at this time  Treatment provided:  1  Wound cleansed with normal saline  2  Applied Cavilon barrier film to periwound  3  Covered with 4"x4" Mepilex AG dressing  4  Instructions provided that dressing can stay on up to 7 days  May shower with dressing on  Remove sooner if heavy drainage or loosens  Wound/Skin Care Plan:   1  For left lower leg, posterior aspect wound: Cleanse with normal saline  Apply alcohol free barrier film to periwound  Cover with a silver impregnated bordered foam dressing   Change every 7 days; sooner if needed due to heavy drainage or begins to fall off  May shower with dressing on  2  Referral to lymphedema clinic  Hospitalist has already placed referral  Patient agreeable to this and requested more information  I discussed with lymphedema therapy could do and also provided written information on this  Provided list of [facility] lymphedema clinics  Patient very appreciative  3  MD order to apply compression stockings to left leg  I do not recommend this as I do not think we have a size large enough to accomodate her leg  I recommend allowing lymphedema therapist sizing for appropriate compression needs  4  Initial discharge wound care supplies provided  Discussed with patient and Gaudencio Cuadra RN

## 2022-08-08 NOTE — DISCHARGE INSTR - OTHER ORDERS
Wound/Skin Care Discharge Plan:   1  For left lower leg, posterior aspect wound: Cleanse with normal saline  Apply alcohol free barrier film to periwound  Cover with a silver impregnated bordered foam dressing  Change every 7 days; sooner if needed due to heavy drainage or begins to fall off  May shower with dressing on  2  Referral to lymphedema clinic  Hospitalist has already placed referral  Patient agreeable to this and requested more information  I discussed with lymphedema therapy could do and also provided written information on this  Provided list of [facility] lymphedema clinics  Patient very appreciative  4  Initial discharge wound care supplies provided

## 2022-08-08 NOTE — DISCHARGE SUMMARY
[facility]  Discharge- Zara Arce 1990, 28 y o  female MRN: [id number]  Unit/Bed#: S -01 Encounter: [id number]  Primary Care Provider: No primary care provider on file  Date and time admitted to hospital: 8/6/2022  8:19 PM    * Cellulitis of left lower extremity  Assessment & Plan  · Presents with left leg cellulitis ongoing since May 2022 (on and off since April 21)  Completed course of keflex in June with no improvement  Body habitus, DM, underlying chronic lymphedema contributing  · Plan:  · Take Doxycycline twice a day for 10 days  · Use topical gel  · Apply clean dressing  · Follow with ENDO for tight BS control  · Follow with Lymphedema clinic      Diabetes [facility])  Assessment & Plan  Lab Results   Component Value Date    HGBA1C 7 7 (H) 08/06/2022       · Diagnosed with DM 4/2021   · Previously prescribed lantus and ozempic  Self discontinued due to diarrhea  · Agreeable to accucheck, SSI  Also agreeable to resuming lantus pending repeat a1c   · Will need glycemic control for wound healing  · Needs ongoing diabetes education, nutrition  · Pt agreed to restart home medication  · Follow with endocrinology in outpatient    Morbid obesity ([facility] )  Assessment & Plan  · BMI 87 7  · Low calories/low Cho diet  · Lifestyle consult  · Bariatric surgery referral  · Follow up with PCP  · Follow with weight management        Medical Problems             Resolved Problems  Date Reviewed: 8/8/2022          Resolved    Severe sepsis ([facility] ) 8/8/2022     Resolved by  Leslie Mack MD              Discharging Resident: Leslie Mack MD  Discharging Attending: Boone Olivo MD  PCP: No primary care provider on file    Admission Date:   Admission Orders (From admission, onward)     Ordered        08/06/22 [address]  Once                      Discharge Date: 08/08/22    Consultations During Hospital Stay:  · Wound care, nutrition     Procedures Performed:   · None    Significant Findings / Test Results:   · none    Incidental Findings:   · none    Test Results Pending at Discharge (will require follow up):  · none     Outpatient Tests Requested:  · none    Complications:  none    Reason for Admission: left lower extremity cellulitis    Hospital Course:   Miri Rodriguez is a 28 y o  female patient with PMH of DM, obesity, PCOS, asthma, arthritis who originally presented to the hospital on 8/6/2022 due left leg cellulitis  Pt had cellulitis on and off since Apr 2021  Most recent began around 5/2   On 6/21 she was given keflex 500 mg QID x 10 days for recurrence without improvement  On presentaion met SIRS critiria  Xray tibia/fibula showed no bone involvement  US LLE showed no fluid collection  Pt received Lasix for 1 dose, compression wrapping, clean wound dressing, Ceftriaxone for 1 dose, Cefazolin for 1 day, and Vancomycin for 2 days  Drainage decrease  Pt is medically optimized to be discharged with oral antibiotic, PCP, lymphedema clinic, weight management, lifestyle management, bariatric coonsult, endocrinology follow up  Please see above list of diagnoses and related plan for additional information  Condition at Discharge: stable    Discharge Day Visit / Exam:   Subjective:  Pt is feeling well  Vitals: Blood Pressure: 132/88 (08/08/22 0716)  Pulse: 99 (08/08/22 0716)  Temperature: 98 °F (36 7 °C) (08/08/22 0716)  Temp Source: Oral (08/06/22 2013)  Respirations: 18 (08/08/22 0716)  Height: 5' 7" (170 2 cm) (08/06/22 2256)  Weight - Scale: (!) 253 kg (558 lb) (08/07/22 0600)  SpO2: 95 % (08/08/22 0716)  Exam:   Physical Exam  Constitutional:       Appearance: She is obese  HENT:      Head: Normocephalic and atraumatic  Nose: Nose normal    Eyes:      Extraocular Movements: Extraocular movements intact  Cardiovascular:      Rate and Rhythm: Normal rate and regular rhythm  Pulses: Normal pulses  Heart sounds: Normal heart sounds  Pulmonary:      Effort: Pulmonary effort is normal       Breath sounds: Normal breath sounds  Abdominal:      General: Bowel sounds are normal       Palpations: Abdomen is soft  Musculoskeletal:         General: Normal range of motion  Right lower leg: Edema present  Left lower leg: Edema present  Skin:     Findings: Erythema, lesion and rash present  Comments: Erythema decreased bellow demarcating line on the anterior surface, no significant changes on the posterior surface of the left lower extremity  Minimal drainage on dressing  Neurological:      General: No focal deficit present  Mental Status: She is alert  Psychiatric:         Mood and Affect: Mood normal           Discussion with Family: Patient declined call to   Discharge instructions/Information to patient and family:   See after visit summary for information provided to patient and family  Provisions for Follow-Up Care:  See after visit summary for information related to follow-up care and any pertinent home health orders  Disposition:   Home    Planned Readmission:  Possible if patient is noncompliant with recommendations    Discharge Medications:  See after visit summary for reconciled discharge medications provided to patient and/or family        **Please Note: This note may have been constructed using a voice recognition system**

## 2022-08-08 NOTE — PROGRESS NOTES
Vancomycin Assessment    Roro Esteban is a 28 y o  female who is currently receiving vancomycin 2g IV q12 hours for skin-soft tissue infection     Relevant clinical data and objective history reviewed:  Creatinine   Date Value Ref Range Status   08/08/2022 0 53 (L) 0 60 - 1 30 mg/dL Final     Comment:     Standardized to IDMS reference method   08/07/2022 0 48 (L) 0 60 - 1 30 mg/dL Final     Comment:     Standardized to IDMS reference method   08/06/2022 0 48 (L) 0 60 - 1 30 mg/dL Final     Comment:     Standardized to IDMS reference method   07/02/2015 0 68 0 60 - 1 30 mg/dL Final     Comment:     Standardized to IDMS reference method     /88   Pulse 99   Temp 98 °F (36 7 °C)   Resp 18   Ht 5' 7" (1 702 m)   Wt (!) 253 kg (558 lb)   SpO2 95%   BMI 87 40 kg/m²   I/O last 3 completed shifts: In: 530 [P O :480; IV Piggyback:50]  Out: 6834 [[id number]]  Lab Results   Component Value Date/Time    BUN 7 08/08/2022 10:46 AM    BUN 9 07/02/2015 04:42 PM    WBC 8 96 08/08/2022 10:46 AM    WBC 9 74 07/05/2015 11:21 AM    HGB 11 3 (L) 08/08/2022 10:46 AM    HGB 11 2 (L) 07/05/2015 11:21 AM    HCT 37 3 08/08/2022 10:46 AM    HCT 36 0 07/05/2015 11:21 AM    MCV 79 (L) 08/08/2022 10:46 AM    MCV 76 (L) 07/05/2015 11:21 AM     08/08/2022 10:46 AM     07/05/2015 11:21 AM     Temp Readings from Last 3 Encounters:   08/08/22 98 °F (36 7 °C)   04/04/21 97 9 °F (36 6 °C)   08/07/20 99 6 °F (37 6 °C) (Tympanic)     Vancomycin Days of Therapy: 2    Assessment/Plan  The patient is currently on vancomycin utilizing scheduled dosing  The patient is receiving 2g IV q12 hours with the most recent vancomycin level being at steady-state and sub-therapeutic based on a goal of 10-15 (mild infection/cellulitis) ; therefore, after clinical evaluation will be changed to 1750mg IV q8 hours   Pharmacy will continue to follow closely for s/sx of nephrotoxicity, infusion reactions, and appropriateness of therapy    BMP and CBC will be ordered per protocol  Plan for trough as patient approaches steady state, prior to the 4th  dose at approximately 31 75 62 on 8/9/22  Pharmacy will continue to follow the patients culture results and clinical progress daily      Sita Peters, Pharmacist

## 2022-08-08 NOTE — DISCHARGE INSTR - AVS FIRST PAGE
Dear Regi Allen,     It was our pleasure to care for you here at [facility] [place]  It is our hope that we were always able to exceed the expected standards for your care during your stay  You were hospitalized due to cellulitis of your legs  You were cared for on the fourth floor by Rocio Hu MD under the service of Maxime Yang MD with the [facility] who covers for your primary care physician (PCP), No primary care provider on file  , while you were hospitalized  If you have any questions or concerns related to this hospitalization, you may contact us at [phone]  For follow up as well as any medication refills, we recommend that you follow up with your primary care physician  A registered nurse will reach out to you by phone within a few days after your discharge to answer any additional questions that you may have after going home  However, at this time we provide for you here, the most important instructions / recommendations at discharge:     Notable Medication Adjustments -   Start doxycycline, take  twice a day for 10 days  Apply clean dressing and topical gel    Testing Required after Discharge -   none  Important follow up information -   Follow with Endocrinology for better Blood sugar control   Follow with weight management clinic  Follow with lifestyle clinic  Follow with lymphedema clinic  Follow with PCP    Other Instructions -   Follow low sugar low carbohydrates diet, regular exercise  Please review this entire after visit summary as additional general instructions including medication list, appointments, activity, diet, any pertinent wound care, and other additional recommendations from your care team that may be provided for you        Sincerely,     Rocio Hu MD

## 2022-08-08 NOTE — UTILIZATION REVIEW
Inpatient Admission Authorization Request   NOTIFICATION OF INPATIENT ADMISSION/INPATIENT AUTHORIZATION REQUEST   SERVICING FACILITY:   [facility]  Darshan VENEGAS, [address] [place] [address]  Tax ID: [id number]  NPI: [id number]  Place of Service: Inpatient [address] [facility]  [address]  Place of Service Code: 24     ATTENDING PROVIDER:  Attending Name and NPI#: Mimi Em [[id number]]  Address: Darshan PONCE, [address] [place] [address]  Phone: [phone]     UTILIZATION REVIEW CONTACT:  Judy Aquino Utilization   Network Utilization Review Department  Phone: [phone]  Fax: [phone]  Email: [email or web address]  [email or web address]     PHYSICIAN ADVISORY SERVICES:  FOR [id number] REVIEW - MEDICAL NECESSITY DENIAL  Phone: [phone]  Fax: [phone]  Email: [email or web address]  [email or web address]     TYPE OF REQUEST:  Inpatient Status     ADMISSION INFORMATION:  ADMISSION DATE/TIME: 8/6/22 10:26 PM  PATIENT DIAGNOSIS CODE/DESCRIPTION:  Cellulitis [L03 90]  Cellulitis of left leg [L03 116]  Sepsis, due to unspecified organism, unspecified whether acute organ dysfunction present ([facility] ) [A41 9]  DISCHARGE DATE/TIME: No discharge date for patient encounter  IMPORTANT INFORMATION:  Please contact Judy Aquino directly with any questions or concerns regarding this request  Department voicemails are confidential     Send requests for admission clinical reviews, concurrent reviews, approvals, and administrative denials due to lack of clinical to fax [phone]

## 2022-08-12 LAB
BACTERIA BLD CULT: NORMAL
BACTERIA BLD CULT: NORMAL

## 2024-01-01 NOTE — DISCHARGE INSTRUCTIONS
Dilation and Curettage   WHAT YOU NEED TO KNOW:   Dilation and curettage (D&C) is a procedure to remove tissue from the lining of your uterus  DISCHARGE INSTRUCTIONS:   Call 911 for any of the following:   · You have signs of an allergic reaction, such as hives, trouble breathing, or severe swelling  Seek care immediately if:   · You have heavy vaginal bleeding that soaks 1 pad in 1 hour for 2 hours in a row  · You have a fever higher than 100 4°F (38°C)  · You have abdominal cramps for more than 2 days  · Your pain does not get better, even after treatment  Contact your healthcare provider if:   · You have foul-smelling vaginal discharge  · You do not get your monthly period  · You feel depressed or anxious  · You feel very tired and weak  · You have questions or concerns about your condition or care  Medicines: You may need any of the following:  · Prescription pain medicine  may be given  Do not wait until the pain is severe before you take your medicine  Ask your healthcare provider how to take this medicine safely  · Antibiotics  help fight or prevent a bacterial infection  · Take your medicine as directed  Contact your healthcare provider if you think your medicine is not helping or if you have side effects  Tell him or her if you are allergic to any medicine  Keep a list of the medicines, vitamins, and herbs you take  Include the amounts, and when and why you take them  Bring the list or the pill bottles to follow-up visits  Carry your medicine list with you in case of an emergency  Self-care:   · Use sanitary pads if needed  You may have light bleeding for up to 2 weeks  Do not use tampons  Use sanitary pads instead  This will help prevent a vaginal infection  · Rest as needed  Slowly start to do more each day  Return to your daily activities as directed  · Do not have sex for at least 2 weeks after the procedure    This will help prevent an infection  · Use birth control right after your procedure  Your monthly period should start again in 4 to 8 weeks  During this time, you could still ovulate (release an egg)  Use birth control as directed to prevent pregnancy during this time  Follow up with your healthcare provider as directed:  Write down your questions so you remember to ask them during your visits  © 2017 [address]  Information is for End User's use only and may not be sold, redistributed or otherwise used for commercial purposes  All illustrations and images included in CareNotes® are the copyrighted property of [employer]  or [employer]  The above information is an  only  It is not intended as medical advice for individual conditions or treatments  Talk to your doctor, nurse or pharmacist before following any medical regimen to see if it is safe and effective for you  1

## (undated) DEVICE — GLOVE INDICATOR PI UNDERGLOVE SZ 8 BLUE

## (undated) DEVICE — GLOVE SRG BIOGEL ECLIPSE 7.5

## (undated) DEVICE — SYRINGE CATH TIP 50ML

## (undated) DEVICE — STRL UNIVERSAL MINOR VAGINAL: Brand: CARDINAL HEALTH

## (undated) DEVICE — INSUFFLATION TUBING PRIMFLO